# Patient Record
Sex: FEMALE | Race: WHITE | NOT HISPANIC OR LATINO | Employment: OTHER | URBAN - METROPOLITAN AREA
[De-identification: names, ages, dates, MRNs, and addresses within clinical notes are randomized per-mention and may not be internally consistent; named-entity substitution may affect disease eponyms.]

---

## 2021-05-26 ENCOUNTER — RECORDS - HEALTHEAST (OUTPATIENT)
Dept: ADMINISTRATIVE | Facility: CLINIC | Age: 51
End: 2021-05-26

## 2021-05-30 ENCOUNTER — RECORDS - HEALTHEAST (OUTPATIENT)
Dept: ADMINISTRATIVE | Facility: CLINIC | Age: 51
End: 2021-05-30

## 2021-06-02 ENCOUNTER — RECORDS - HEALTHEAST (OUTPATIENT)
Dept: ADMINISTRATIVE | Facility: CLINIC | Age: 51
End: 2021-06-02

## 2023-03-24 ENCOUNTER — ANCILLARY PROCEDURE (OUTPATIENT)
Dept: MAMMOGRAPHY | Facility: CLINIC | Age: 53
End: 2023-03-24
Attending: FAMILY MEDICINE

## 2023-03-24 DIAGNOSIS — N63.20 LEFT BREAST LUMP: ICD-10-CM

## 2023-03-24 PROCEDURE — 77062 BREAST TOMOSYNTHESIS BI: CPT

## 2023-03-24 PROCEDURE — 76642 ULTRASOUND BREAST LIMITED: CPT | Mod: 50

## 2023-03-29 ENCOUNTER — ANCILLARY PROCEDURE (OUTPATIENT)
Dept: MAMMOGRAPHY | Facility: CLINIC | Age: 53
End: 2023-03-29
Attending: FAMILY MEDICINE

## 2023-03-29 DIAGNOSIS — R92.0 MICROCALCIFICATIONS OF THE BREAST: ICD-10-CM

## 2023-03-29 DIAGNOSIS — N63.20 LEFT BREAST LUMP: ICD-10-CM

## 2023-03-29 PROCEDURE — 88305 TISSUE EXAM BY PATHOLOGIST: CPT | Mod: TC | Performed by: FAMILY MEDICINE

## 2023-03-29 PROCEDURE — 250N000009 HC RX 250: Performed by: RADIOLOGY

## 2023-03-29 PROCEDURE — A4648 IMPLANTABLE TISSUE MARKER: HCPCS

## 2023-03-29 PROCEDURE — 272N000715 MA STEREOTACTIC BREAST BIOPSY VACUUM LT

## 2023-03-29 PROCEDURE — 88305 TISSUE EXAM BY PATHOLOGIST: CPT | Mod: 26 | Performed by: PATHOLOGY

## 2023-03-29 RX ORDER — VITAMIN B COMPLEX
TABLET ORAL DAILY
COMMUNITY

## 2023-03-29 RX ORDER — LIDOCAINE HYDROCHLORIDE AND EPINEPHRINE 10; 10 MG/ML; UG/ML
10 INJECTION, SOLUTION INFILTRATION; PERINEURAL ONCE
Status: COMPLETED | OUTPATIENT
Start: 2023-03-29 | End: 2023-03-29

## 2023-03-29 RX ADMIN — LIDOCAINE HYDROCHLORIDE 10 ML: 10 INJECTION, SOLUTION INFILTRATION; PERINEURAL at 14:23

## 2023-03-29 RX ADMIN — LIDOCAINE HYDROCHLORIDE AND EPINEPHRINE 10 ML: 10; 10 INJECTION, SOLUTION INFILTRATION; PERINEURAL at 14:23

## 2023-03-29 NOTE — DISCHARGE INSTRUCTIONS

## 2023-03-30 ENCOUNTER — TELEPHONE (OUTPATIENT)
Dept: MAMMOGRAPHY | Facility: CLINIC | Age: 53
End: 2023-03-30

## 2023-03-30 LAB
PATH REPORT.COMMENTS IMP SPEC: NORMAL
PATH REPORT.FINAL DX SPEC: NORMAL
PATH REPORT.GROSS SPEC: NORMAL
PATH REPORT.MICROSCOPIC SPEC OTHER STN: NORMAL
PATH REPORT.RELEVANT HX SPEC: NORMAL
PHOTO IMAGE: NORMAL

## 2023-03-30 NOTE — TELEPHONE ENCOUNTER
At the request of the Breast Center Radiologist, Dr. Mon,  I phoned patient and discussed the benign breast biopsy results.      Per Dr. Mon's recommendation, patient was advised that she may return to her routine breast screening schedule.  Patient also has number to call for scheduling high risk breast screening consult.      Patient denies any concerns at her biopsy site. Questions were answered and my phone number given if she has further questions or concerns.  Ordering provider was informed of these results.  Patient verbalized understanding and agrees with the plan of care.       Leesa Skinner RN, BSN, PHN, CBCN  Breast Center Imaging Nurse Coordinator   [facility] [place]  [address]  [place] [address]  [phone]

## 2023-05-13 ENCOUNTER — HEALTH MAINTENANCE LETTER (OUTPATIENT)
Age: 53
End: 2023-05-13

## 2023-06-15 ENCOUNTER — MEDICAL CORRESPONDENCE (OUTPATIENT)
Dept: HEALTH INFORMATION MANAGEMENT | Facility: CLINIC | Age: 53
End: 2023-06-15

## 2023-06-15 ENCOUNTER — TRANSCRIBE ORDERS (OUTPATIENT)
Dept: MATERNAL FETAL MEDICINE | Facility: HOSPITAL | Age: 53
End: 2023-06-15

## 2023-06-15 ENCOUNTER — TRANSFERRED RECORDS (OUTPATIENT)
Dept: HEALTH INFORMATION MANAGEMENT | Facility: CLINIC | Age: 53
End: 2023-06-15

## 2023-06-15 DIAGNOSIS — O26.90 PREGNANCY RELATED CONDITION, ANTEPARTUM: Primary | ICD-10-CM

## 2023-06-15 LAB
ALT SERPL-CCNC: 28 U/L (ref 6–29)
AST SERPL-CCNC: 20 U/L (ref 10–35)
CREATININE (EXTERNAL): 0.53 MG/DL (ref 0.5–1.03)
GFR ESTIMATED (EXTERNAL): 113 ML/MIN/1.73M2
GLUCOSE (EXTERNAL): 87 MG/DL (ref 65–99)
HEP C HIM: NORMAL
HEPATITIS B SURFACE ANTIGEN (EXTERNAL): NONREACTIVE
HEPATITIS C ANTIBODY (EXTERNAL): NONREACTIVE
HIV 1&2 EXT: NORMAL
HIV1+2 AB SERPL QL IA: NONREACTIVE
HPV ABSTRACT: NORMAL
PAP-ABSTRACT: NORMAL
POTASSIUM (EXTERNAL): 4.2 MMOL/L (ref 3.5–5.3)
RUBELLA ANTIBODY IGG (EXTERNAL): NORMAL
TREPONEMA PALLIDUM ANTIBODY (EXTERNAL): NONREACTIVE

## 2023-06-19 ENCOUNTER — TELEPHONE (OUTPATIENT)
Dept: MATERNAL FETAL MEDICINE | Facility: CLINIC | Age: 53
End: 2023-06-19

## 2023-06-19 DIAGNOSIS — O09.529 AMA (ADVANCED MATERNAL AGE) MULTIGRAVIDA 35+: Primary | ICD-10-CM

## 2023-06-19 NOTE — TELEPHONE ENCOUNTER
Pt called as it stated she wanted a THANH to [facility] for AMA. Pt informed that she does not qualify as a primary pt for M, but offered to give pt information for [facility]. Pt states she is able to find the number. Informed pt that scheduling will call her in the next day to schedule appt for GC/NT/Radiologic consult. Pt verbalized agreement.     Annamarie Cha RN

## 2023-06-26 ENCOUNTER — PRE VISIT (OUTPATIENT)
Dept: MATERNAL FETAL MEDICINE | Facility: HOSPITAL | Age: 53
End: 2023-06-26

## 2023-06-28 ENCOUNTER — MEDICAL CORRESPONDENCE (OUTPATIENT)
Dept: HEALTH INFORMATION MANAGEMENT | Facility: CLINIC | Age: 53
End: 2023-06-28

## 2023-06-28 ENCOUNTER — ANCILLARY PROCEDURE (OUTPATIENT)
Dept: ULTRASOUND IMAGING | Facility: HOSPITAL | Age: 53
End: 2023-06-28
Attending: OBSTETRICS & GYNECOLOGY

## 2023-06-28 ENCOUNTER — OFFICE VISIT (OUTPATIENT)
Dept: MATERNAL FETAL MEDICINE | Facility: HOSPITAL | Age: 53
End: 2023-06-28
Attending: OBSTETRICS & GYNECOLOGY

## 2023-06-28 ENCOUNTER — LAB (OUTPATIENT)
Dept: LAB | Facility: HOSPITAL | Age: 53
End: 2023-06-28
Attending: OBSTETRICS & GYNECOLOGY

## 2023-06-28 VITALS — HEART RATE: 77 BPM | DIASTOLIC BLOOD PRESSURE: 70 MMHG | SYSTOLIC BLOOD PRESSURE: 114 MMHG | OXYGEN SATURATION: 100 %

## 2023-06-28 DIAGNOSIS — O09.529 AMA (ADVANCED MATERNAL AGE) MULTIGRAVIDA 35+: ICD-10-CM

## 2023-06-28 DIAGNOSIS — O09.521 MULTIGRAVIDA OF ADVANCED MATERNAL AGE IN FIRST TRIMESTER: ICD-10-CM

## 2023-06-28 PROCEDURE — 76813 OB US NUCHAL MEAS 1 GEST: CPT | Mod: 26 | Performed by: OBSTETRICS & GYNECOLOGY

## 2023-06-28 PROCEDURE — 99204 OFFICE O/P NEW MOD 45 MIN: CPT | Mod: 25 | Performed by: OBSTETRICS & GYNECOLOGY

## 2023-06-28 PROCEDURE — 36415 COLL VENOUS BLD VENIPUNCTURE: CPT

## 2023-06-28 PROCEDURE — 96040 HC GENETIC COUNSELING, EACH 30 MINUTES: CPT | Performed by: GENETIC COUNSELOR, MS

## 2023-06-28 PROCEDURE — G0463 HOSPITAL OUTPT CLINIC VISIT: HCPCS | Mod: 25 | Performed by: OBSTETRICS & GYNECOLOGY

## 2023-06-28 PROCEDURE — 76813 OB US NUCHAL MEAS 1 GEST: CPT

## 2023-06-28 NOTE — PROGRESS NOTES
Maternal-Fetal Medicine Consultation    Chuyita Walters  : 1970  MRN: [id number]    REFERRAL:  Chuyita Walters is a 52 year old sent by Dr. Oliver for consultation due to pregnancy at age 35 years or older.    HPI:  Chuyita Walters is a 52 year old  at 11w6d by LMP consistent with 8w0d ultrasound presenting for consultation regarding pregnancy at age 35 years or older. Chuyita reports a history of irregular menses, which she attributed to being perimenopausal. She had a spontaneous conception in the current pregnancy. The couple are very excited for the pregnancy. Chuyita reports that she is overall very healthy. She is active around the home. She does not have any significant past medical history. She did have a recent mammogram and breast biopsy in March of this year, which returned with fibrocystic changes. Repeat mammogram was recommended in one year. Her blood pressure was mildly elevated at 147/72 with repeat blood pressure of 135/73 at her first OB visit. She does not have any history of hypertension and her blood pressures have since been normal. Her previous pregnancies have been uncomplicated and she delivered at term. She did have one  section in , followed by a successful  in her last pregnancy in 2013.     Chuyita is feeling well today. She does note feeling mildly short of breath with activity during the current pregnancy. She feels like this is what she experienced in pervious pregnancies, but that it has just started earlier in the current pregnancy. She is currently on vaginal progesterone supplementation for low progesterone levels.    Pregnancy complicated by:  1) Maternal age 53 at the time of delivery  2) History of prior  section    Prenatal Care:  Primary OB care this pregnancy has been with Dr. Oliver from [facility].    Obstetrics History:  OB History    Para Term  AB Living   8 6 6 0 1 6   SAB IAB Ectopic Multiple Live Births   1 0 0 0 6      # Outcome  Date GA Lbr Lamine/2nd Weight Sex Delivery Anes PTL Lv   8 Current            7 SAB            6 Term            5 Term      CS-LTranv      4 Term      Vag-Spont      3 Term      Vag-Spont      2 Term      Vag-Spont      1 Term      Vag-Spont        Past Medical History:  1) Breast lump, s/p breast biopsy 3/2023    Past Surgical History:  1) LTCS in   2) Bilateral cataract surgery    Current Medications:  1) PNV  2) Progesterone 400 mg by mouth at night  3) Aspirin    Allergies:  Patient has no known allergies.    ROS:  10-point ROS negative except as in HPI     PHYSICAL EXAM:  /70 (BP Location: Right arm, Patient Position: Chair, Cuff Size: Adult Regular)   Pulse 77   LMP 2023   SpO2 100%   Gen: NAD, well appearing  Chest: Non-labored breathing  Abdomen: gravid    Genetic Testing:   Cell-free DNA screening drawn today    Ultrasounds:   Please see the imaging tab for details of the ultrasound performed today.    ASSESSMENT:  52 year old y.o.  at 11w6d by LMP, consistent with 8w 0d ultrasound here for consultation regarding maternal age of 53 at the time of delivery.    The risk of fetal aneuploidy increases with age. We reviewed these risks at length and we discussed the options available for risk assessment for aneuploidy during pregnancy. The couple are interested in cell-free DNA screening. They shared the results would not change their management of the pregnancy, but that if a chromosomal difference is suspected it would help them to prepare.    Women of very advanced maternal age (VAMA) (>=45 years) are at higher risk for several complications, including but not limited to: spontaneous , gestational diabetes mellitus, stillbirth  delivery at less than 37 and 34 weeks of gestation, placenta previa and accreta, hypertensive complications, and preeclampsia Their newborns were more frequently small for gestational age, and are more likely to have high rates of respiratory  distress syndrome,  intensive care admission, growth restriction and / mortality. Pregnancies have >50%  delivery rate. There is also an increased risk of prolonged hospital stay, postpartum hemorrhage, postpartum fever, metabolic complications and ICU admission.    Recommendations:    The patient has opted for cell-free DNA screening, which was performed through our office today.    Early GCT recommended now, with repeat testing at 24-28 weeks gestation if passes initial GDM screening.    Obtain baseline EKG, with echocardiogram if any abnormalities noted.    Comprehensive ultrasound at 18-20 weeks (scheduled in our office).    A repeat assessment of fetal growth at 28 and 34 weeks gestation.     surveillance with weekly BPP (or NST with MVP) beginning at 36 weeks.    Delivery is recommended at 39 weeks gestation.    The route of delivery should be based on the usual obstetric indications. Recommend continuing counseling regarding repeat  section versus TOLAC as the pregnancy progresses.    Low-dose aspirin (usually 81 mg daily) to reduce the risk for hypertensive disorder of pregnancy.      Colonoscopy recommended for colon cancer screening postpartum (discussed that this is recommended beginning at age 45).    45 MINUTES SPENT BY ME on the date of service doing chart review, history, exam, documentation & further activities per the note.    Mendy Arias MD  Date of Service (when I saw the patient): 23

## 2023-06-28 NOTE — PROGRESS NOTES
"[facility]  Genetic Counseling Consult    Patient:  Chuyita Walters YOB: 1970   Date of Service:  23   MRN: [id number]    Chuyita was seen at the [facility] [place] for genetic consultation. The indication for genetic counseling is advanced maternal age. The patient was accompanied to this visit by her partner, Franklyn.       IMPRESSION/ PLAN     During today's MFM visit, Chuyita had a blood draw for NIPS through [facility]. The NIPS screens for trisomy 21, 18, and 13 and the patient opted to screen for sex chromosome aneuploidies, including reported fetal sex. Results are expected in 7-10 days. The patient will be called with results and if they do not answer they requested a detailed message with results on their voicemail, including the predicted fetal sex information.  Patient was informed that results, including fetal sex, will be available in [employer].    Chuyita had a nuchal translucency ultrasound today. Please see the ultrasound report for further details.    Further recommendations include a level II ultrasound with MFM and maternal serum AFP only screening for neural tube defects, if desired (quad screen should NOT be performed). The level II ultrasound has been scheduled for 2023.     PREGNANCY HISTORY   /Parity:       Chuyita's pregnancy history is significant for:     2000: term, , female    2002: term, , female    2005: term, , female    2007: term, , female    2011: term, , female    2013: term, , male    2018: 7 week SAB    CURRENT PREGNANCY   Current Age: 52 year old   Age at Delivery: 53 year old  KRYSTA: 2024, by Last Menstrual Period                                   Gestational Age: 11w6d  This pregnancy is a single gestation.   This pregnancy was conceived spontaneously.      FAMILY HISTORY   A three-generation pedigree was obtained today and is scanned under the \"Media\" " tab in Epic. The family history was reported by Chuyita and her partner.    The following significant findings were reported today:     Chuyita's sister had a pregnancy loss with a nonviable trisomy, possibly trisomy 18.    Chuyita's sister has a daughter with Down syndrome. We reviewed that most cases of Down syndrome are caused by nondisjunction of chromosome 21 and would not increase their risk to have a child with Down syndrome. However, approximately 5% of individuals with Down syndrome can have an unbalanced translocation form of Down syndrome which may carry implications for other family members.     Chuyita's other sister has developmental disabilities which were thought to be caused by birth trauma.    Otherwise, the reported family history is unremarkable for multiple miscarriages, stillbirths, birth defects, intellectual disabilities, and consanguinity.       CARRIER SCREENING   Expanded carrier screening is available to screen for autosomal recessive conditions and X-linked conditions in a large list of genes. Autosomal recessive conditions happen when a mutation has been inherited from the egg and sperm and include conditions like cystic fibrosis, thalassemia, hearing loss, spinal muscular atrophy, and more. X-linked conditions happen when a mutation has been inherited from the egg and include conditions like fragile X syndrome. Gilbert screening was also reviewed. About [place]  Screening    The patient has declined the carrier screening options today. They are aware the option will remain, and they can contact us if they would like to pursue screening.       RISK ASSESSMENT FOR CHROMOSOME CONDITIONS   We explained that the risk for fetal chromosome abnormalities increases with maternal age. We discussed specific features of common chromosome abnormalities, including Down syndrome, trisomy 13, trisomy 18, and sex chromosome trisomies.      At age 53 at delivery, the midtrimester risk to have a baby with Down  syndrome is 15%.     At age 53 at delivery, the midtrimester risk to have a baby with any chromosome abnormality is 25%.     Chuyita has not had genetic screening in this pregnancy but elected to have screening today.      GENETIC TESTING OPTIONS   Genetic testing during a pregnancy includes screening and diagnostic procedures.      Screening tests are non-invasive which means no risk to the pregnancy and includes ultrasounds and blood work. The benefits and limitations of screening were reviewed. Screening tests provide a risk assessment (chance) specific to the pregnancy for certain fetal chromosome abnormalities but cannot definitively diagnose or exclude a fetal chromosome abnormality. Follow-up genetic counseling and consideration of diagnostic testing is recommended with any abnormal screening result. Diagnostic testing during a pregnancy is more certain and can test for more conditions. However, the tests do have a risk of miscarriage that requires careful consideration. These tests can detect fetal chromosome abnormalities with greater than 99% certainty. Results can be compromised by maternal cell contamination or mosaicism and are limited by the resolution of current genetic testing technology.     There is no screening or diagnostic test that detects all forms of birth defects or intellectual disability.     We discussed the following screening options:   First trimester screening    Ultrasound between [id number] that includes nuchal translucency measurement and nasal bone assessments    Nuchal translucency refers to the space at the back of the neck where fluid builds up. All babies at this stage have fluid and there is only concern if there is too much fluid    Nasal bone refers to the small bone in the nose. There is concern for conditions like Down syndrome if the bone cannot be seen at all    Blood work from arm for levels of hCG and NEDA-A    Screens for trisomy 21 and trisomy 18    Cannot screen for  open neural tube defects, maternal serum AFP after 15 weeks is recommended    Non-invasive prenatal testing (NIPT)    Also called cell-free DNA screening because it detects chromosomes from the placenta in the pregnant person's blood    Can be done any time after 10 weeks gestation    Screens for trisomy 21, trisomy 18, trisomy 13, and sex chromosome aneuploidies    Cannot screen for open neural tube defects, maternal serum AFP after 15 weeks is recommended      We discussed the following ultrasound options:  Nuchal translucency (NT) ultrasound    Ultrasound between [id number] that includes nuchal translucency measurement and nasal bone assessments    Nuchal translucency refers to the space at the back of the neck where fluid builds up. All babies at this stage have fluid and there is only concern if there is too much fluid    Nasal bone refers to the small bone in the nose. There is concern for conditions like Down syndrome if the bone cannot be seen at all    This ultrasound can be done as part of first trimester screening, at the same time as another screen (NIPT), at the same time as a CVS, or if the patients does not want genetic screening.    Markers on ultrasound detects about 70% of pregnancies with aneuploidy    Abnormalities on NT ultrasound can also increase the risk for a birth defect, like a heart defect    Comprehensive level II ultrasound (Fetal Anatomy Ultrasound)    Ultrasound done between 18-20 weeks gestation    Screens for major birth defects and markers for aneuploidy (like trisomy 21 and trisomy 18)    Includes looking at the fetus/baby's growth, heart, organs (stomach, kidneys), placenta, and amniotic fluid      We discussed the following diagnostic options:   Chorionic villus sampling (CVS)    Invasive diagnostic procedure done between 10w0d and 13w6d    The procedure collects a small sample from the placenta for the purpose of chromosomal testing and/or other genetic testing    Diagnostic  result; more than 99% sensitivity for fetal chromosome abnormalities    Cannot screen for open neural tube defects, maternal serum AFP after 15 weeks is recommended    Amniocentesis    Invasive diagnostic procedure done after 15 weeks gestation    The procedure collects a small sample of amniotic fluid for the purpose of chromosomal testing and/or other genetic testing    Diagnostic result; more than 99% sensitivity for fetal chromosome abnormalities    Testing for AFP in the amniotic fluid can test for open neural tube defects        It was a pleasure to be involved with Chuyita appiah praneeth. Face-to-face time of the meeting was 45 minutes.    Makeda Sanford, BAY, MS, LGC  Certified and [place] Licensed Genetic Counselor  [facility]  Maternal Fetal Medicine  Office: [phone]  [facility]: [phone]   Fax: [phone]  [facility]

## 2023-07-10 ENCOUNTER — TELEPHONE (OUTPATIENT)
Dept: MATERNAL FETAL MEDICINE | Facility: HOSPITAL | Age: 53
End: 2023-07-10

## 2023-07-10 ENCOUNTER — TRANSFERRED RECORDS (OUTPATIENT)
Dept: HEALTH INFORMATION MANAGEMENT | Facility: CLINIC | Age: 53
End: 2023-07-10

## 2023-07-10 DIAGNOSIS — O09.521 MULTIGRAVIDA OF ADVANCED MATERNAL AGE IN FIRST TRIMESTER: Primary | ICD-10-CM

## 2023-07-10 LAB — SCANNED LAB RESULT: NORMAL

## 2023-07-10 NOTE — TELEPHONE ENCOUNTER
Called patient and discussed NIPT testing failed quality metrics and the lab was not able to issue a result. Will attempt a repeat sample. Orders placed    Makeda Sanford MS, [facility]  Maternal Fetal Medicine  [facility]  Phone:[phone]

## 2023-07-11 ENCOUNTER — MEDICAL CORRESPONDENCE (OUTPATIENT)
Dept: HEALTH INFORMATION MANAGEMENT | Facility: CLINIC | Age: 53
End: 2023-07-11

## 2023-07-11 ENCOUNTER — LAB (OUTPATIENT)
Dept: LAB | Facility: HOSPITAL | Age: 53
End: 2023-07-11

## 2023-07-11 DIAGNOSIS — O09.521 MULTIGRAVIDA OF ADVANCED MATERNAL AGE IN FIRST TRIMESTER: ICD-10-CM

## 2023-07-11 PROCEDURE — 36415 COLL VENOUS BLD VENIPUNCTURE: CPT

## 2023-07-12 ENCOUNTER — TRANSCRIBE ORDERS (OUTPATIENT)
Dept: OTHER | Age: 53
End: 2023-07-12

## 2023-07-12 DIAGNOSIS — O09.529 ANTEPARTUM MULTIGRAVIDA OF ADVANCED MATERNAL AGE: Primary | ICD-10-CM

## 2023-07-14 ENCOUNTER — MEDICAL CORRESPONDENCE (OUTPATIENT)
Dept: HEALTH INFORMATION MANAGEMENT | Facility: CLINIC | Age: 53
End: 2023-07-14

## 2023-07-17 ENCOUNTER — TELEPHONE (OUTPATIENT)
Dept: MATERNAL FETAL MEDICINE | Facility: HOSPITAL | Age: 53
End: 2023-07-17

## 2023-07-17 LAB — SCANNED LAB RESULT: NORMAL

## 2023-07-20 ENCOUNTER — OFFICE VISIT (OUTPATIENT)
Dept: CARDIOLOGY | Facility: CLINIC | Age: 53
End: 2023-07-20

## 2023-07-20 VITALS
OXYGEN SATURATION: 98 % | DIASTOLIC BLOOD PRESSURE: 65 MMHG | WEIGHT: 184 LBS | HEART RATE: 86 BPM | SYSTOLIC BLOOD PRESSURE: 112 MMHG

## 2023-07-20 DIAGNOSIS — O09.529 ANTEPARTUM MULTIGRAVIDA OF ADVANCED MATERNAL AGE: ICD-10-CM

## 2023-07-20 DIAGNOSIS — O09.522 MULTIGRAVIDA OF ADVANCED MATERNAL AGE IN SECOND TRIMESTER: ICD-10-CM

## 2023-07-20 PROCEDURE — 93000 ELECTROCARDIOGRAM COMPLETE: CPT | Performed by: INTERNAL MEDICINE

## 2023-07-20 PROCEDURE — 99203 OFFICE O/P NEW LOW 30 MIN: CPT | Performed by: INTERNAL MEDICINE

## 2023-07-20 RX ORDER — ASPIRIN 81 MG/1
81 TABLET ORAL DAILY
Status: ON HOLD | COMMUNITY
End: 2023-12-20

## 2023-07-20 RX ORDER — PROGESTERONE 200 MG/1
400 CAPSULE ORAL AT BEDTIME
Status: ON HOLD | COMMUNITY
Start: 2023-06-28 | End: 2023-12-17

## 2023-07-20 NOTE — PROGRESS NOTES
CARDIOLOGY CLINIC CONSULTATION    PRIMARY CARE PHYSICIAN:  KYLE DUNN    Tests reviewed/interpreted independently in clinic today:   1. EKG: NSR  2. Echocardiogram: NA  3. Blood work: CBC, BMP     The level of medical decision making during this visit was of mild complexity.     HISTORY OF PRESENT ILLNESS:  Today, I had the pleasure of connecting with Chuyita Walters.  She is a very pleasant 52-year-old lady who presents to the clinic in initial consultation for screening due to advanced maternal age.  She is due in January of next year.  She does not have any history of postpartum cardiomyopathy and is otherwise very healthy.  She does not have any cardiovascular risk factors.  She has noticed mild ankle edema but no other worrisome symptoms suggestive of heart failure.  She also has not had any conduction issues and denies palpitations, presyncope or syncope.  An EKG performed in the clinic today shows normal sinus rhythm.    ASSESSMENT: Pertinent issues addressed/ reviewed during this cardiology visit    1. Screening for advanced maternal age  2. No history of previous postpartum cardiomyopathy    RECOMMENDATIONS:  1. Her  EKG is normal and I discussed this with her.  We went over basic precautions that can be taken during pregnancy to avoid hypotension and minimize lower extremity edema.  2. I will perform a transthoracic echocardiogram to obtain a baseline and try to get this done in the third trimester when the circulating volume is the maximum.  As far as the decision to perform  which is normal delivery is concerned this will basically be the choice of her obstetrician, if echocardiogram is completely normal.    Orders Placed This Encounter   Procedures     EKG 12-lead complete w/read - Clinics     Echocardiogram Complete       PAST MEDICAL HISTORY:  No past medical history on file.    MEDICATIONS:  Current Outpatient Medications   Medication     aspirin 81 MG EC tablet     progesterone  (PROMETRIUM) 200 MG capsule     Vitamin D3 (VITAMIN D, CHOLECALCIFEROL,) 25 mcg (1000 units) tablet     No current facility-administered medications for this visit.       ALLERGIES:  No Known Allergies    SOCIAL HISTORY:  I have reviewed this patient's social history and updated it with pertinent information if needed. Chuyita Walters  reports that she has never smoked. She has never used smokeless tobacco.    FAMILY HISTORY:  I have reviewed this patient's family history and updated it with pertinent information if needed.   No family history on file.    REVIEW OF SYSTEMS:  Skin:        Eyes:       ENT:       Respiratory:  Positive for shortness of breath;dyspnea on exertion  Cardiovascular:  Negative;chest pain;palpitations;edema;dizziness;lightheadedness;syncope or near-syncope Positive for;fatigue  Gastroenterology:      Genitourinary:       Musculoskeletal:       Neurologic:       Psychiatric:       Heme/Lymph/Imm:       Endocrine:           PHYSICAL EXAM:      BP: 112/65 Pulse: 86     SpO2: 98 %      Vital Signs with Ranges  Pulse:  [86] 86  BP: (112)/(65) 112/65  SpO2:  [98 %] 98 %  184 lbs 0 oz    Constitutional: alert, no distress  Respiratory: Good bilateral air entry  Cardiovascular: Normal S1-S2, no murmurs  GI: nondistended  Neuropsychiatric: appropriate affact    It was a pleasure seeing this patient in clinic today. Please do not hesitate to contact me with any future questions.     Carrillo MACK, FACC, [facility]  Cardiology - [facility]  July 20, 2023    This note was completed in part using dictation via the Dragon voice recognition software. Some word and grammatical errors may occur and must be interpreted in the appropriate clinical context.  If there are any questions pertaining to this issue, please contact me for further clarification.

## 2023-07-20 NOTE — LETTER
2023    KYLE DUNN MD  [facility] [address]  [facility] [address]    RE: Chuyita Walters       Dear Colleague,     I had the pleasure of seeing Chuyita Walters in the [facility].  CARDIOLOGY CLINIC CONSULTATION    PRIMARY CARE PHYSICIAN:  KYLE DUNN    Tests reviewed/interpreted independently in clinic today:   EKG: NSR  Echocardiogram: NA  Blood work: CBC, BMP     The level of medical decision making during this visit was of mild complexity.     HISTORY OF PRESENT ILLNESS:  Today, I had the pleasure of connecting with Chuyita Walters.  She is a very pleasant 52-year-old lady who presents to the clinic in initial consultation for screening due to advanced maternal age.  She is due in January of next year.  She does not have any history of postpartum cardiomyopathy and is otherwise very healthy.  She does not have any cardiovascular risk factors.  She has noticed mild ankle edema but no other worrisome symptoms suggestive of heart failure.  She also has not had any conduction issues and denies palpitations, presyncope or syncope.  An EKG performed in the clinic today shows normal sinus rhythm.    ASSESSMENT: Pertinent issues addressed/ reviewed during this cardiology visit    Screening for advanced maternal age  No history of previous postpartum cardiomyopathy    RECOMMENDATIONS:  Her  EKG is normal and I discussed this with her.  We went over basic precautions that can be taken during pregnancy to avoid hypotension and minimize lower extremity edema.  I will perform a transthoracic echocardiogram to obtain a baseline and try to get this done in the third trimester when the circulating volume is the maximum.  As far as the decision to perform  which is normal delivery is concerned this will basically be the choice of her obstetrician, if echocardiogram is completely normal.    Orders Placed This Encounter   Procedures    EKG 12-lead complete w/read - Clinics     Echocardiogram Complete       PAST MEDICAL HISTORY:  No past medical history on file.    MEDICATIONS:  Current Outpatient Medications   Medication    aspirin 81 MG EC tablet    progesterone (PROMETRIUM) 200 MG capsule    Vitamin D3 (VITAMIN D, CHOLECALCIFEROL,) 25 mcg (1000 units) tablet     No current facility-administered medications for this visit.       ALLERGIES:  No Known Allergies    SOCIAL HISTORY:  I have reviewed this patient's social history and updated it with pertinent information if needed. Chuyita Walters  reports that she has never smoked. She has never used smokeless tobacco.    FAMILY HISTORY:  I have reviewed this patient's family history and updated it with pertinent information if needed.   No family history on file.    REVIEW OF SYSTEMS:  Skin:        Eyes:       ENT:       Respiratory:  Positive for shortness of breath;dyspnea on exertion  Cardiovascular:  Negative;chest pain;palpitations;edema;dizziness;lightheadedness;syncope or near-syncope Positive for;fatigue  Gastroenterology:      Genitourinary:       Musculoskeletal:       Neurologic:       Psychiatric:       Heme/Lymph/Imm:       Endocrine:           PHYSICAL EXAM:      BP: 112/65 Pulse: 86     SpO2: 98 %      Vital Signs with Ranges  Pulse:  [86] 86  BP: (112)/(65) 112/65  SpO2:  [98 %] 98 %  184 lbs 0 oz    Constitutional: alert, no distress  Respiratory: Good bilateral air entry  Cardiovascular: Normal S1-S2, no murmurs  GI: nondistended  Neuropsychiatric: appropriate affact    It was a pleasure seeing this patient in clinic today. Please do not hesitate to contact me with any future questions.     Carrillo MACK, FACC, [facility]  Cardiology - [facility]  July 20, 2023    This note was completed in part using dictation via the Dragon voice recognition software. Some word and grammatical errors may occur and must be interpreted in the appropriate clinical context.  If there are any questions pertaining to this issue, please contact me  for further clarification.      Thank you for allowing me to participate in the care of your patient.      Sincerely,     Carrillo Shaw MD     [facility]  cc:   No referring provider defined for this encounter.

## 2023-08-16 ENCOUNTER — OFFICE VISIT (OUTPATIENT)
Dept: MATERNAL FETAL MEDICINE | Facility: HOSPITAL | Age: 53
End: 2023-08-16
Attending: OBSTETRICS & GYNECOLOGY

## 2023-08-16 ENCOUNTER — ANCILLARY PROCEDURE (OUTPATIENT)
Dept: ULTRASOUND IMAGING | Facility: HOSPITAL | Age: 53
End: 2023-08-16
Attending: OBSTETRICS & GYNECOLOGY

## 2023-08-16 DIAGNOSIS — O09.529 ANTEPARTUM MULTIGRAVIDA OF ADVANCED MATERNAL AGE: Primary | ICD-10-CM

## 2023-08-16 DIAGNOSIS — O09.521 MULTIGRAVIDA OF ADVANCED MATERNAL AGE IN FIRST TRIMESTER: ICD-10-CM

## 2023-08-16 PROCEDURE — 99203 OFFICE O/P NEW LOW 30 MIN: CPT | Mod: 25 | Performed by: OBSTETRICS & GYNECOLOGY

## 2023-08-16 PROCEDURE — 76811 OB US DETAILED SNGL FETUS: CPT | Mod: 26 | Performed by: OBSTETRICS & GYNECOLOGY

## 2023-08-16 PROCEDURE — 76811 OB US DETAILED SNGL FETUS: CPT

## 2023-08-16 NOTE — NURSING NOTE
Chuyita Walters is a  at 18w6d who presents to [facility] for a comprehensive ultrasound. SBAR given to Dr. Jiang, see note in Epic.      Renetta Hanna RN

## 2023-08-16 NOTE — PROGRESS NOTES
Please see full imaging report from [employer] program under imaging tab.    Thank-you for referring your patient for a comprehensive ultrasound.    I discussed the findings on today's ultrasound with the patient. I reviewed the limitations of ultrasound both in detecting aneuploidy and structural abnormalities.  Ultrasound can routinely detect 80-90% of structural abnormalities. She had low risk cell free fetal DNA for genetic screening this pregnancy. She will be 53 years of age at [facility]. This is a spontaneous pregnancy.     I discussed that on today s ultrasound choroid plexus cysts (CPC) were noted. We discussed that CPCs are seen in 1-2% of all pregnancies in the second trimester and can be single or multiple, unilateral or bilateral and are often small (<1cm) in diameter. In the absence of other anatomic abnormalities or soft markers in conjunction with her low risk cell free fetal DNA result this is considered to be a normal finding.  Finally, we discussed that CPCs are not considered a structural or functional brain abnormality and therefore have no impact on structural brain development or function. Specifically, there is no association with neurodevelopmental outcomes. Almost all resolve by 28 weeks and they do not require any follow-up prenatal or postnatally.     We discussed recommended surveillance in the context of maternal age.  I have recommended growth US at 28 and 34 weeks, and she would like to have those done with [facility]. She will continue OB care at [facility]. She will discuss with them where she will do her BPPs, recommended at 36 weeks. Delivery by [facility] is also strongly recommended.     Chuyita and her partner also inquired about our input regarding her recent borderline early one hour GCT of just over 135. They are aware of the different one hour GCT cut offs at different institutions. Chuyita also did not tolerate well her 3 hour GTT in prior pregnancies and is apprehensive about doing that now and again at  25-28 weeks.     We discussed that the standard of care would be to proceed with an early GTT, and if that is normal to repeat it at 25-28 weeks. She is at an increased risk of GDM based on age. However, given her prior response to the GTT an alternative might be to have her primary OB team get her a glucometer and instruct in its use, and she could check glucose values 3-4 times daily for a week. If the values are all normal, she can wait until 25-28 weeks and then either do the GTT or repeat the week of glucose values. If values are noted to be abnormal, a tentative diagnosis of GDM could be made and she can institute regular glucose surveillance. She is also going to focus on dietary and exercise changes in the meantime. She will contact her OB team on Friday to review this discussion in more detail and we will of course defer to their care for next steps.     She also started on low dose aspirin recently which we support.     Return to primary provider for continued prenatal care.    If you have questions regarding today's evaluation or if we can be of further service, please contact the [facility].    **Fetal anomalies may be present but not detected**    I spent a total of 35 minutes on the date of this encounter including preparing to see the patient (reviewing medical records/tests), counseling and discussing the plan of care, documenting the visit in the electronic medical record, and communicating with other health care professionals and/or care coordination.    Perri Jiang MD  Maternal Fetal Medicine

## 2023-10-20 ENCOUNTER — HOSPITAL ENCOUNTER (OUTPATIENT)
Dept: CARDIOLOGY | Facility: CLINIC | Age: 53
Discharge: HOME | End: 2023-10-20
Attending: INTERNAL MEDICINE | Admitting: INTERNAL MEDICINE

## 2023-10-20 DIAGNOSIS — O09.522 MULTIGRAVIDA OF ADVANCED MATERNAL AGE IN SECOND TRIMESTER: ICD-10-CM

## 2023-10-20 LAB — LVEF ECHO: NORMAL

## 2023-10-20 PROCEDURE — 93306 TTE W/DOPPLER COMPLETE: CPT | Mod: 26 | Performed by: INTERNAL MEDICINE

## 2023-10-20 PROCEDURE — 93306 TTE W/DOPPLER COMPLETE: CPT

## 2023-12-17 ENCOUNTER — TRANSFERRED RECORDS (OUTPATIENT)
Dept: HEALTH INFORMATION MANAGEMENT | Facility: CLINIC | Age: 53
End: 2023-12-17

## 2023-12-17 ENCOUNTER — ANESTHESIA (OUTPATIENT)
Dept: OBGYN | Facility: HOSPITAL | Age: 53
End: 2023-12-17

## 2023-12-17 ENCOUNTER — HOSPITAL ENCOUNTER (INPATIENT)
Facility: HOSPITAL | Age: 53
LOS: 3 days | Discharge: HOME | End: 2023-12-20
Attending: OBSTETRICS & GYNECOLOGY | Admitting: OBSTETRICS & GYNECOLOGY

## 2023-12-17 ENCOUNTER — ANESTHESIA EVENT (OUTPATIENT)
Dept: OBGYN | Facility: HOSPITAL | Age: 53
End: 2023-12-17

## 2023-12-17 DIAGNOSIS — Z98.891 S/P REPEAT LOW TRANSVERSE C-SECTION: Primary | ICD-10-CM

## 2023-12-17 DIAGNOSIS — O42.90 LEAKAGE OF AMNIOTIC FLUID: ICD-10-CM

## 2023-12-17 PROBLEM — Z36.89 ENCOUNTER FOR TRIAGE IN PREGNANT PATIENT: Status: ACTIVE | Noted: 2023-12-17

## 2023-12-17 LAB
ABO/RH(D): NORMAL
ALBUMIN SERPL BCG-MCNC: 3.4 G/DL (ref 3.5–5.2)
ALP SERPL-CCNC: 108 U/L (ref 40–150)
ALT SERPL W P-5'-P-CCNC: 17 U/L (ref 0–50)
ANION GAP SERPL CALCULATED.3IONS-SCNC: 10 MMOL/L (ref 7–15)
ANTIBODY SCREEN, TUBE: NORMAL
AST SERPL W P-5'-P-CCNC: 28 U/L (ref 0–45)
BILIRUB SERPL-MCNC: 0.3 MG/DL
BUN SERPL-MCNC: 12.7 MG/DL (ref 6–20)
CALCIUM SERPL-MCNC: 9 MG/DL (ref 8.6–10)
CHLORIDE SERPL-SCNC: 105 MMOL/L (ref 98–107)
CREAT SERPL-MCNC: 0.54 MG/DL (ref 0.51–0.95)
DEPRECATED HCO3 PLAS-SCNC: 22 MMOL/L (ref 22–29)
EGFRCR SERPLBLD CKD-EPI 2021: >90 ML/MIN/1.73M2
ERYTHROCYTE [DISTWIDTH] IN BLOOD BY AUTOMATED COUNT: 14.9 % (ref 10–15)
GLUCOSE BLDC GLUCOMTR-MCNC: 81 MG/DL (ref 70–99)
GLUCOSE SERPL-MCNC: 81 MG/DL (ref 70–99)
HCT VFR BLD AUTO: 40.9 % (ref 35–47)
HGB BLD-MCNC: 13.5 G/DL (ref 11.7–15.7)
HOLD SPECIMEN: NORMAL
MCH RBC QN AUTO: 31 PG (ref 26.5–33)
MCHC RBC AUTO-ENTMCNC: 33 G/DL (ref 31.5–36.5)
MCV RBC AUTO: 94 FL (ref 78–100)
PLATELET # BLD AUTO: 280 10E3/UL (ref 150–450)
POTASSIUM SERPL-SCNC: 4.2 MMOL/L (ref 3.4–5.3)
PROT SERPL-MCNC: 6.6 G/DL (ref 6.4–8.3)
RBC # BLD AUTO: 4.35 10E6/UL (ref 3.8–5.2)
RUPTURE OF FETAL MEMBRANES BY ROM PLUS: POSITIVE
SODIUM SERPL-SCNC: 137 MMOL/L (ref 135–145)
SPECIMEN EXPIRATION DATE: NORMAL
SPECIMEN EXPIRATION DATE: NORMAL
WBC # BLD AUTO: 11.4 10E3/UL (ref 4–11)

## 2023-12-17 PROCEDURE — 86780 TREPONEMA PALLIDUM: CPT | Performed by: OBSTETRICS & GYNECOLOGY

## 2023-12-17 PROCEDURE — C9290 INJ, BUPIVACAINE LIPOSOME: HCPCS | Performed by: ANESTHESIOLOGY

## 2023-12-17 PROCEDURE — 258N000003 HC RX IP 258 OP 636: Performed by: NURSE ANESTHETIST, CERTIFIED REGISTERED

## 2023-12-17 PROCEDURE — 86901 BLOOD TYPING SEROLOGIC RH(D): CPT | Performed by: OBSTETRICS & GYNECOLOGY

## 2023-12-17 PROCEDURE — 999N000249 HC STATISTIC C-SECTION ON UNIT

## 2023-12-17 PROCEDURE — 250N000013 HC RX MED GY IP 250 OP 250 PS 637: Performed by: OBSTETRICS & GYNECOLOGY

## 2023-12-17 PROCEDURE — 360N000076 HC SURGERY LEVEL 3, PER MIN: Performed by: OBSTETRICS & GYNECOLOGY

## 2023-12-17 PROCEDURE — 86850 RBC ANTIBODY SCREEN: CPT | Performed by: OBSTETRICS & GYNECOLOGY

## 2023-12-17 PROCEDURE — 250N000011 HC RX IP 250 OP 636: Performed by: OBSTETRICS & GYNECOLOGY

## 2023-12-17 PROCEDURE — 84112 EVAL AMNIOTIC FLUID PROTEIN: CPT | Performed by: OBSTETRICS & GYNECOLOGY

## 2023-12-17 PROCEDURE — 258N000003 HC RX IP 258 OP 636: Performed by: OBSTETRICS & GYNECOLOGY

## 2023-12-17 PROCEDURE — 250N000011 HC RX IP 250 OP 636: Performed by: NURSE ANESTHETIST, CERTIFIED REGISTERED

## 2023-12-17 PROCEDURE — 250N000011 HC RX IP 250 OP 636: Performed by: ANESTHESIOLOGY

## 2023-12-17 PROCEDURE — 80053 COMPREHEN METABOLIC PANEL: CPT | Performed by: OBSTETRICS & GYNECOLOGY

## 2023-12-17 PROCEDURE — 370N000017 HC ANESTHESIA TECHNICAL FEE, PER MIN: Performed by: OBSTETRICS & GYNECOLOGY

## 2023-12-17 PROCEDURE — 120N000001 HC R&B MED SURG/OB

## 2023-12-17 PROCEDURE — 250N000011 HC RX IP 250 OP 636: Mod: JZ | Performed by: NURSE ANESTHETIST, CERTIFIED REGISTERED

## 2023-12-17 PROCEDURE — 250N000009 HC RX 250: Performed by: OBSTETRICS & GYNECOLOGY

## 2023-12-17 PROCEDURE — 272N000001 HC OR GENERAL SUPPLY STERILE: Performed by: OBSTETRICS & GYNECOLOGY

## 2023-12-17 PROCEDURE — 85027 COMPLETE CBC AUTOMATED: CPT | Performed by: OBSTETRICS & GYNECOLOGY

## 2023-12-17 PROCEDURE — 250N000009 HC RX 250: Performed by: NURSE ANESTHETIST, CERTIFIED REGISTERED

## 2023-12-17 RX ORDER — TRANEXAMIC ACID 10 MG/ML
1 INJECTION, SOLUTION INTRAVENOUS EVERY 30 MIN PRN
Status: DISCONTINUED | OUTPATIENT
Start: 2023-12-17 | End: 2023-12-20 | Stop reason: HOSPADM

## 2023-12-17 RX ORDER — DEXTROSE, SODIUM CHLORIDE, SODIUM LACTATE, POTASSIUM CHLORIDE, AND CALCIUM CHLORIDE 5; .6; .31; .03; .02 G/100ML; G/100ML; G/100ML; G/100ML; G/100ML
INJECTION, SOLUTION INTRAVENOUS CONTINUOUS
Status: DISCONTINUED | OUTPATIENT
Start: 2023-12-17 | End: 2023-12-20 | Stop reason: HOSPADM

## 2023-12-17 RX ORDER — ONDANSETRON 4 MG/1
4 TABLET, ORALLY DISINTEGRATING ORAL EVERY 30 MIN PRN
Status: DISCONTINUED | OUTPATIENT
Start: 2023-12-17 | End: 2023-12-20 | Stop reason: HOSPADM

## 2023-12-17 RX ORDER — SIMETHICONE 80 MG
80 TABLET,CHEWABLE ORAL 4 TIMES DAILY PRN
Status: DISCONTINUED | OUTPATIENT
Start: 2023-12-17 | End: 2023-12-20 | Stop reason: HOSPADM

## 2023-12-17 RX ORDER — ACETAMINOPHEN 325 MG/1
975 TABLET ORAL ONCE
Status: COMPLETED | OUTPATIENT
Start: 2023-12-17 | End: 2023-12-17

## 2023-12-17 RX ORDER — TRANEXAMIC ACID 10 MG/ML
1 INJECTION, SOLUTION INTRAVENOUS EVERY 30 MIN PRN
Status: DISCONTINUED | OUTPATIENT
Start: 2023-12-17 | End: 2023-12-17 | Stop reason: HOSPADM

## 2023-12-17 RX ORDER — CARBOPROST TROMETHAMINE 250 UG/ML
250 INJECTION, SOLUTION INTRAMUSCULAR
Status: DISCONTINUED | OUTPATIENT
Start: 2023-12-17 | End: 2023-12-20 | Stop reason: HOSPADM

## 2023-12-17 RX ORDER — BUPIVACAINE HYDROCHLORIDE 2.5 MG/ML
INJECTION, SOLUTION EPIDURAL; INFILTRATION; INTRACAUDAL
Status: COMPLETED | OUTPATIENT
Start: 2023-12-17 | End: 2023-12-17

## 2023-12-17 RX ORDER — PENICILLIN G 3000000 [IU]/50ML
3 INJECTION, SOLUTION INTRAVENOUS EVERY 4 HOURS
Status: DISCONTINUED | OUTPATIENT
Start: 2023-12-17 | End: 2023-12-17

## 2023-12-17 RX ORDER — KETOROLAC TROMETHAMINE 30 MG/ML
30 INJECTION, SOLUTION INTRAMUSCULAR; INTRAVENOUS EVERY 6 HOURS
Qty: 3 ML | Refills: 0 | Status: COMPLETED | OUTPATIENT
Start: 2023-12-17 | End: 2023-12-18

## 2023-12-17 RX ORDER — LIDOCAINE 40 MG/G
CREAM TOPICAL
Status: DISCONTINUED | OUTPATIENT
Start: 2023-12-17 | End: 2023-12-20 | Stop reason: HOSPADM

## 2023-12-17 RX ORDER — SODIUM CHLORIDE, SODIUM LACTATE, POTASSIUM CHLORIDE, CALCIUM CHLORIDE 600; 310; 30; 20 MG/100ML; MG/100ML; MG/100ML; MG/100ML
INJECTION, SOLUTION INTRAVENOUS CONTINUOUS
Status: DISCONTINUED | OUTPATIENT
Start: 2023-12-17 | End: 2023-12-17 | Stop reason: HOSPADM

## 2023-12-17 RX ORDER — OXYTOCIN/0.9 % SODIUM CHLORIDE 30/500 ML
PLASTIC BAG, INJECTION (ML) INTRAVENOUS CONTINUOUS PRN
Status: DISCONTINUED | OUTPATIENT
Start: 2023-12-17 | End: 2023-12-17

## 2023-12-17 RX ORDER — CEFAZOLIN SODIUM/WATER 2 G/20 ML
2 SYRINGE (ML) INTRAVENOUS
Status: COMPLETED | OUTPATIENT
Start: 2023-12-17 | End: 2023-12-17

## 2023-12-17 RX ORDER — METOCLOPRAMIDE HYDROCHLORIDE 5 MG/ML
10 INJECTION INTRAMUSCULAR; INTRAVENOUS EVERY 6 HOURS PRN
Status: DISCONTINUED | OUTPATIENT
Start: 2023-12-17 | End: 2023-12-20 | Stop reason: HOSPADM

## 2023-12-17 RX ORDER — METHYLERGONOVINE MALEATE 0.2 MG/ML
200 INJECTION INTRAVENOUS
Status: DISCONTINUED | OUTPATIENT
Start: 2023-12-17 | End: 2023-12-17 | Stop reason: HOSPADM

## 2023-12-17 RX ORDER — ONDANSETRON 4 MG/1
4 TABLET, ORALLY DISINTEGRATING ORAL EVERY 6 HOURS PRN
Status: DISCONTINUED | OUTPATIENT
Start: 2023-12-17 | End: 2023-12-20 | Stop reason: HOSPADM

## 2023-12-17 RX ORDER — PENICILLIN G POTASSIUM 5000000 [IU]/1
5 INJECTION, POWDER, FOR SOLUTION INTRAMUSCULAR; INTRAVENOUS ONCE
Status: COMPLETED | OUTPATIENT
Start: 2023-12-17 | End: 2023-12-17

## 2023-12-17 RX ORDER — LIDOCAINE 40 MG/G
CREAM TOPICAL
Status: DISCONTINUED | OUTPATIENT
Start: 2023-12-17 | End: 2023-12-17 | Stop reason: HOSPADM

## 2023-12-17 RX ORDER — CARBOPROST TROMETHAMINE 250 UG/ML
250 INJECTION, SOLUTION INTRAMUSCULAR
Status: DISCONTINUED | OUTPATIENT
Start: 2023-12-17 | End: 2023-12-17 | Stop reason: HOSPADM

## 2023-12-17 RX ORDER — OXYTOCIN/0.9 % SODIUM CHLORIDE 30/500 ML
100-340 PLASTIC BAG, INJECTION (ML) INTRAVENOUS CONTINUOUS PRN
Status: DISCONTINUED | OUTPATIENT
Start: 2023-12-17 | End: 2023-12-20 | Stop reason: HOSPADM

## 2023-12-17 RX ORDER — METHYLERGONOVINE MALEATE 0.2 MG/ML
200 INJECTION INTRAVENOUS
Status: DISCONTINUED | OUTPATIENT
Start: 2023-12-17 | End: 2023-12-20 | Stop reason: HOSPADM

## 2023-12-17 RX ORDER — BISACODYL 10 MG
10 SUPPOSITORY, RECTAL RECTAL DAILY PRN
Status: DISCONTINUED | OUTPATIENT
Start: 2023-12-19 | End: 2023-12-19

## 2023-12-17 RX ORDER — MISOPROSTOL 200 UG/1
400 TABLET ORAL
Status: DISCONTINUED | OUTPATIENT
Start: 2023-12-17 | End: 2023-12-20 | Stop reason: HOSPADM

## 2023-12-17 RX ORDER — METOCLOPRAMIDE 10 MG/1
10 TABLET ORAL EVERY 6 HOURS PRN
Status: DISCONTINUED | OUTPATIENT
Start: 2023-12-17 | End: 2023-12-20 | Stop reason: HOSPADM

## 2023-12-17 RX ORDER — ONDANSETRON 2 MG/ML
4 INJECTION INTRAMUSCULAR; INTRAVENOUS EVERY 30 MIN PRN
Status: DISCONTINUED | OUTPATIENT
Start: 2023-12-17 | End: 2023-12-20 | Stop reason: HOSPADM

## 2023-12-17 RX ORDER — OXYTOCIN 10 [USP'U]/ML
10 INJECTION, SOLUTION INTRAMUSCULAR; INTRAVENOUS
Status: DISCONTINUED | OUTPATIENT
Start: 2023-12-17 | End: 2023-12-20 | Stop reason: HOSPADM

## 2023-12-17 RX ORDER — BUPIVACAINE HYDROCHLORIDE 7.5 MG/ML
INJECTION, SOLUTION INTRASPINAL
Status: COMPLETED | OUTPATIENT
Start: 2023-12-17 | End: 2023-12-17

## 2023-12-17 RX ORDER — OXYTOCIN/0.9 % SODIUM CHLORIDE 30/500 ML
340 PLASTIC BAG, INJECTION (ML) INTRAVENOUS CONTINUOUS PRN
Status: DISCONTINUED | OUTPATIENT
Start: 2023-12-17 | End: 2023-12-17 | Stop reason: HOSPADM

## 2023-12-17 RX ORDER — ONDANSETRON 2 MG/ML
4 INJECTION INTRAMUSCULAR; INTRAVENOUS EVERY 6 HOURS PRN
Status: DISCONTINUED | OUTPATIENT
Start: 2023-12-17 | End: 2023-12-20 | Stop reason: HOSPADM

## 2023-12-17 RX ORDER — MORPHINE SULFATE 1 MG/ML
INJECTION, SOLUTION EPIDURAL; INTRATHECAL; INTRAVENOUS
Status: COMPLETED | OUTPATIENT
Start: 2023-12-17 | End: 2023-12-17

## 2023-12-17 RX ORDER — IBUPROFEN 800 MG/1
800 TABLET, FILM COATED ORAL EVERY 6 HOURS
Status: DISCONTINUED | OUTPATIENT
Start: 2023-12-18 | End: 2023-12-20 | Stop reason: HOSPADM

## 2023-12-17 RX ORDER — PROCHLORPERAZINE 25 MG
25 SUPPOSITORY, RECTAL RECTAL EVERY 12 HOURS PRN
Status: DISCONTINUED | OUTPATIENT
Start: 2023-12-17 | End: 2023-12-20 | Stop reason: HOSPADM

## 2023-12-17 RX ORDER — MISOPROSTOL 200 UG/1
800 TABLET ORAL
Status: DISCONTINUED | OUTPATIENT
Start: 2023-12-17 | End: 2023-12-20 | Stop reason: HOSPADM

## 2023-12-17 RX ORDER — NALOXONE HYDROCHLORIDE 0.4 MG/ML
0.2 INJECTION, SOLUTION INTRAMUSCULAR; INTRAVENOUS; SUBCUTANEOUS
Status: DISCONTINUED | OUTPATIENT
Start: 2023-12-17 | End: 2023-12-20 | Stop reason: HOSPADM

## 2023-12-17 RX ORDER — FENTANYL CITRATE 50 UG/ML
50 INJECTION, SOLUTION INTRAMUSCULAR; INTRAVENOUS EVERY 5 MIN PRN
Status: DISCONTINUED | OUTPATIENT
Start: 2023-12-17 | End: 2023-12-20 | Stop reason: HOSPADM

## 2023-12-17 RX ORDER — OXYTOCIN 10 [USP'U]/ML
10 INJECTION, SOLUTION INTRAMUSCULAR; INTRAVENOUS
Status: DISCONTINUED | OUTPATIENT
Start: 2023-12-17 | End: 2023-12-17 | Stop reason: HOSPADM

## 2023-12-17 RX ORDER — NALOXONE HYDROCHLORIDE 0.4 MG/ML
0.4 INJECTION, SOLUTION INTRAMUSCULAR; INTRAVENOUS; SUBCUTANEOUS
Status: DISCONTINUED | OUTPATIENT
Start: 2023-12-17 | End: 2023-12-20 | Stop reason: HOSPADM

## 2023-12-17 RX ORDER — SODIUM CHLORIDE, SODIUM LACTATE, POTASSIUM CHLORIDE, CALCIUM CHLORIDE 600; 310; 30; 20 MG/100ML; MG/100ML; MG/100ML; MG/100ML
INJECTION, SOLUTION INTRAVENOUS CONTINUOUS
Status: DISCONTINUED | OUTPATIENT
Start: 2023-12-17 | End: 2023-12-20 | Stop reason: HOSPADM

## 2023-12-17 RX ORDER — MISOPROSTOL 200 UG/1
800 TABLET ORAL
Status: DISCONTINUED | OUTPATIENT
Start: 2023-12-17 | End: 2023-12-17 | Stop reason: HOSPADM

## 2023-12-17 RX ORDER — AMOXICILLIN 250 MG
2 CAPSULE ORAL 2 TIMES DAILY
Status: DISCONTINUED | OUTPATIENT
Start: 2023-12-17 | End: 2023-12-20 | Stop reason: HOSPADM

## 2023-12-17 RX ORDER — OXYCODONE HYDROCHLORIDE 5 MG/1
5 TABLET ORAL EVERY 4 HOURS PRN
Status: DISCONTINUED | OUTPATIENT
Start: 2023-12-17 | End: 2023-12-20 | Stop reason: HOSPADM

## 2023-12-17 RX ORDER — FENTANYL CITRATE 50 UG/ML
25 INJECTION, SOLUTION INTRAMUSCULAR; INTRAVENOUS EVERY 5 MIN PRN
Status: DISCONTINUED | OUTPATIENT
Start: 2023-12-17 | End: 2023-12-20 | Stop reason: HOSPADM

## 2023-12-17 RX ORDER — PROCHLORPERAZINE MALEATE 10 MG
10 TABLET ORAL EVERY 6 HOURS PRN
Status: DISCONTINUED | OUTPATIENT
Start: 2023-12-17 | End: 2023-12-20 | Stop reason: HOSPADM

## 2023-12-17 RX ORDER — HYDROMORPHONE HCL IN WATER/PF 6 MG/30 ML
0.4 PATIENT CONTROLLED ANALGESIA SYRINGE INTRAVENOUS EVERY 5 MIN PRN
Status: DISCONTINUED | OUTPATIENT
Start: 2023-12-17 | End: 2023-12-20 | Stop reason: HOSPADM

## 2023-12-17 RX ORDER — CEFAZOLIN SODIUM/WATER 2 G/20 ML
2 SYRINGE (ML) INTRAVENOUS SEE ADMIN INSTRUCTIONS
Status: DISCONTINUED | OUTPATIENT
Start: 2023-12-17 | End: 2023-12-17 | Stop reason: HOSPADM

## 2023-12-17 RX ORDER — HYDROMORPHONE HCL IN WATER/PF 6 MG/30 ML
0.2 PATIENT CONTROLLED ANALGESIA SYRINGE INTRAVENOUS EVERY 5 MIN PRN
Status: DISCONTINUED | OUTPATIENT
Start: 2023-12-17 | End: 2023-12-20 | Stop reason: HOSPADM

## 2023-12-17 RX ORDER — HYDROCORTISONE 25 MG/G
CREAM TOPICAL 3 TIMES DAILY PRN
Status: DISCONTINUED | OUTPATIENT
Start: 2023-12-17 | End: 2023-12-20 | Stop reason: HOSPADM

## 2023-12-17 RX ORDER — ACETAMINOPHEN 325 MG/1
975 TABLET ORAL EVERY 6 HOURS
Status: DISCONTINUED | OUTPATIENT
Start: 2023-12-17 | End: 2023-12-20 | Stop reason: HOSPADM

## 2023-12-17 RX ORDER — OXYTOCIN/0.9 % SODIUM CHLORIDE 30/500 ML
340 PLASTIC BAG, INJECTION (ML) INTRAVENOUS CONTINUOUS PRN
Status: DISCONTINUED | OUTPATIENT
Start: 2023-12-17 | End: 2023-12-20 | Stop reason: HOSPADM

## 2023-12-17 RX ORDER — MISOPROSTOL 200 UG/1
400 TABLET ORAL
Status: DISCONTINUED | OUTPATIENT
Start: 2023-12-17 | End: 2023-12-17 | Stop reason: HOSPADM

## 2023-12-17 RX ORDER — VITAMIN A ACETATE, .BETA.-CAROTENE, ASCORBIC ACID, CHOLECALCIFEROL, .ALPHA.-TOCOPHEROL ACETATE, DL-, THIAMINE MONONITRATE, RIBOFLAVIN, NIACINAMIDE, PYRIDOXINE HYDROCHLORIDE, FOLIC ACID, CYANOCOBALAMIN, CALCIUM CARBONATE, FERROUS FUMARATE, ZINC OXIDE, AND CUPRIC OXIDE 2000; 2000; 120; 400; 22; 1.84; 3; 20; 10; 1; 12; 200; 27; 25; 2 [IU]/1; [IU]/1; MG/1; [IU]/1; MG/1; MG/1; MG/1; MG/1; MG/1; MG/1; UG/1; MG/1; MG/1; MG/1; MG/1
1 TABLET ORAL DAILY
COMMUNITY

## 2023-12-17 RX ORDER — ONDANSETRON 2 MG/ML
INJECTION INTRAMUSCULAR; INTRAVENOUS PRN
Status: DISCONTINUED | OUTPATIENT
Start: 2023-12-17 | End: 2023-12-17

## 2023-12-17 RX ORDER — AMOXICILLIN 250 MG
1 CAPSULE ORAL 2 TIMES DAILY
Status: DISCONTINUED | OUTPATIENT
Start: 2023-12-17 | End: 2023-12-20 | Stop reason: HOSPADM

## 2023-12-17 RX ORDER — MODIFIED LANOLIN
OINTMENT (GRAM) TOPICAL
Status: DISCONTINUED | OUTPATIENT
Start: 2023-12-17 | End: 2023-12-20 | Stop reason: HOSPADM

## 2023-12-17 RX ORDER — CITRIC ACID/SODIUM CITRATE 334-500MG
30 SOLUTION, ORAL ORAL
Status: COMPLETED | OUTPATIENT
Start: 2023-12-17 | End: 2023-12-17

## 2023-12-17 RX ADMIN — SODIUM CHLORIDE, POTASSIUM CHLORIDE, SODIUM LACTATE AND CALCIUM CHLORIDE: 600; 310; 30; 20 INJECTION, SOLUTION INTRAVENOUS at 18:31

## 2023-12-17 RX ADMIN — TRANEXAMIC ACID 1 G: 10 INJECTION, SOLUTION INTRAVENOUS at 15:49

## 2023-12-17 RX ADMIN — SODIUM CITRATE AND CITRIC ACID MONOHYDRATE 30 ML: 500; 334 SOLUTION ORAL at 16:49

## 2023-12-17 RX ADMIN — ACETAMINOPHEN 975 MG: 325 TABLET ORAL at 23:07

## 2023-12-17 RX ADMIN — BUPIVACAINE HYDROCHLORIDE IN DEXTROSE 1.6 ML: 7.5 INJECTION, SOLUTION SUBARACHNOID at 17:05

## 2023-12-17 RX ADMIN — ACETAMINOPHEN 975 MG: 325 TABLET ORAL at 16:49

## 2023-12-17 RX ADMIN — PHENYLEPHRINE HYDROCHLORIDE 0.5 MCG/KG/MIN: 10 INJECTION INTRAVENOUS at 17:07

## 2023-12-17 RX ADMIN — KETOROLAC TROMETHAMINE 30 MG: 30 INJECTION, SOLUTION INTRAMUSCULAR; INTRAVENOUS at 23:11

## 2023-12-17 RX ADMIN — ONDANSETRON 4 MG: 2 INJECTION INTRAMUSCULAR; INTRAVENOUS at 16:59

## 2023-12-17 RX ADMIN — AZITHROMYCIN MONOHYDRATE 500 MG: 500 INJECTION, POWDER, LYOPHILIZED, FOR SOLUTION INTRAVENOUS at 16:08

## 2023-12-17 RX ADMIN — DOCUSATE SODIUM 50 MG AND SENNOSIDES 8.6 MG 1 TABLET: 8.6; 5 TABLET, FILM COATED ORAL at 22:26

## 2023-12-17 RX ADMIN — PENICILLIN G POTASSIUM 5 MILLION UNITS: 5000000 POWDER, FOR SOLUTION INTRAMUSCULAR; INTRAPLEURAL; INTRATHECAL; INTRAVENOUS at 12:52

## 2023-12-17 RX ADMIN — Medication 2 G: at 16:55

## 2023-12-17 RX ADMIN — BUPIVACAINE HYDROCHLORIDE 20 ML: 2.5 INJECTION, SOLUTION EPIDURAL; INFILTRATION; INTRACAUDAL at 18:29

## 2023-12-17 RX ADMIN — Medication 300 ML/HR: at 17:33

## 2023-12-17 RX ADMIN — Medication 0.15 MG: at 17:05

## 2023-12-17 RX ADMIN — BUPIVACAINE 20 ML: 13.3 INJECTION, SUSPENSION, LIPOSOMAL INFILTRATION at 18:29

## 2023-12-17 RX ADMIN — SODIUM CHLORIDE, POTASSIUM CHLORIDE, SODIUM LACTATE AND CALCIUM CHLORIDE: 600; 310; 30; 20 INJECTION, SOLUTION INTRAVENOUS at 12:51

## 2023-12-17 RX ADMIN — SODIUM CHLORIDE, POTASSIUM CHLORIDE, SODIUM LACTATE AND CALCIUM CHLORIDE 500 ML: 600; 310; 30; 20 INJECTION, SOLUTION INTRAVENOUS at 16:28

## 2023-12-17 ASSESSMENT — ACTIVITIES OF DAILY LIVING (ADL)
ADLS_ACUITY_SCORE: 18
ADLS_ACUITY_SCORE: 35
ADLS_ACUITY_SCORE: 18

## 2023-12-17 NOTE — PLAN OF CARE
Problem: Labor  Goal: Hemostasis  Outcome: Progressing     Problem: Labor  Goal: Stable Fetal Wellbeing  Outcome: Progressing   Goal Outcome Evaluation:                  Patient will remain stable until  section this afternoon

## 2023-12-17 NOTE — PROGRESS NOTES
0920: Patient arrived ambulatory from home stated felt gush of clear fluid at 0745 this AM. No contractions at this time, cervix has never been checked in clinic. Patient pregnancy complicated by GDM in Insulin, patient has implant for insulin in arm and AMA. Has been 10years since last vaginal birth. 12years since Primary C/S for [facility] and 4 vaginal births prior to c/s. Patient very torn on plan for route of delivery with this baby. Lots of bedside communication done on various potential options of inductions/augmentation and how a c/s routine is done and what to expect. Patient still very undecided, POC await for ROM results. Dr Sarabia called and updated on patient status, orders received and updated on patients route of delivery concerns. POC await for ROM results, updated OB with results and SVE and plan for OB to directly discuss with patient following results POC for delivery at that time if indicated.

## 2023-12-17 NOTE — ANESTHESIA PROCEDURE NOTES
"Intrathecal injection Procedure Note    Pre-Procedure   Staff -        Anesthesiologist:  Neftali Johnson MD       Performed By: anesthesiologist       Location: OR       Procedure Start/Stop Times: 12/17/2023 5:05 PM and 12/17/2023 5:10 PM       Pre-Anesthestic Checklist: patient identified, IV checked, risks and benefits discussed, informed consent, monitors and equipment checked, pre-op evaluation, at physician/surgeon's request and post-op pain management  Timeout:       Correct Patient: Yes        Correct Procedure: Yes        Correct Site: Yes        Correct Position: Yes   Procedure Documentation  Procedure: intrathecal injection       Patient Position: sitting       Patient Prep/Sterile Barriers: sterile gloves, mask, patient draped       Skin prep: Chloraprep       Insertion Site: L3-4. (midline approach).       Needle Gauge: 25.        Needle Length (Inches): 4        Spinal Needle Type: Pencan       Introducer used       # of attempts: 1 and  # of redirects:     Assessment/Narrative         Paresthesias: No.       CSF fluid: clear.    Medication(s) Administered   0.75% Hyperbaric Bupivacaine (Intrathecal) - Intrathecal   1.6 mL - 12/17/2023 5:05:00 PM  Morphine PF 1 mg/mL (Intrathecal) - Intrathecal   0.15 mg - 12/17/2023 5:05:00 PM  Medication Administration Time: 12/17/2023 5:05 PM      FOR [facility] ([facility] [place]) ONLY:   Pain Team Contact information: please page the Pain Team Via [employer]. Search \"Pain\". During daytime hours, please page the attending first. At night please page the resident first.      "

## 2023-12-17 NOTE — ANESTHESIA PREPROCEDURE EVALUATION
"Anesthesia Pre-Procedure Evaluation    Patient: Chuyita Walters   MRN: [id number] : 1970        Procedure : Procedure(s):   SECTION          No past medical history on file.   No past surgical history on file.   No Known Allergies   Social History     Tobacco Use    Smoking status: Never    Smokeless tobacco: Never   Substance Use Topics    Alcohol use: Not on file      Wt Readings from Last 1 Encounters:   23 85.7 kg (189 lb)        Anesthesia Evaluation            ROS/MED HX  ENT/Pulmonary:  - neg pulmonary ROS     Neurologic:  - neg neurologic ROS     Cardiovascular:  - neg cardiovascular ROS     METS/Exercise Tolerance: >4 METS    Hematologic:  - neg hematologic  ROS     Musculoskeletal:  - neg musculoskeletal ROS     GI/Hepatic:  - neg GI/hepatic ROS     Renal/Genitourinary:  - neg Renal ROS     Endo:  - neg endo ROS     Psychiatric/Substance Use:  - neg psychiatric ROS     Infectious Disease:  - neg infectious disease ROS     Malignancy:  - neg malignancy ROS     Other:  - neg other ROS          Physical Exam    Airway        Mallampati: II    Neck ROM: full     Respiratory Devices and Support         Dental           Cardiovascular   cardiovascular exam normal          Pulmonary   pulmonary exam normal                OUTSIDE LABS:  CBC:   Lab Results   Component Value Date    WBC 11.4 (H) 2023    HGB 13.5 2023    HCT 40.9 2023     2023     BMP:   Lab Results   Component Value Date     2023    POTASSIUM 4.2 2023    CHLORIDE 105 2023    CO2 22 2023    BUN 12.7 2023    CR 0.54 2023    GLC 81 2023     COAGS: No results found for: \"PTT\", \"INR\", \"FIBR\"  POC: No results found for: \"BGM\", \"HCG\", \"HCGS\"  HEPATIC:   Lab Results   Component Value Date    ALBUMIN 3.4 (L) 2023    PROTTOTAL 6.6 2023    ALT 17 2023    AST 28 2023    ALKPHOS 108 2023    BILITOTAL 0.3 2023     OTHER:   Lab " Results   Component Value Date    LUDWIN 9.0 12/17/2023       Anesthesia Plan    ASA Status:  2       Anesthesia Type: Spinal.              Consents    Anesthesia Plan(s) and associated risks, benefits, and realistic alternatives discussed. Questions answered and patient/representative(s) expressed understanding.     - Discussed:     - Discussed with:  Patient            Postoperative Care            Comments:               Neftali Johnson MD    I have reviewed the pertinent notes and labs in the chart from the past 30 days and (re)examined the patient.  Any updates or changes from those notes are reflected in this note.          # Hypoalbuminemia: Lowest albumin = 3.4 g/dL at 12/17/2023 11:24 AM, will monitor as appropriate    # Drug Induced Platelet Defect: home medication list includes an antiplatelet medication

## 2023-12-17 NOTE — PROGRESS NOTES
Patient consents signed, nozin, ting cleanse and shave performed. Additional cloths given and patient requesting to cleanse self from neck to knees front and back in restroom next time she gets up to void while avoiding the breasts. Will return within 1hr from surgery to initiate abx, txa, tylenol, and Bicitra.

## 2023-12-18 LAB
HGB BLD-MCNC: 12.4 G/DL (ref 11.7–15.7)
T PALLIDUM AB SER QL: NONREACTIVE

## 2023-12-18 PROCEDURE — 36415 COLL VENOUS BLD VENIPUNCTURE: CPT | Performed by: OBSTETRICS & GYNECOLOGY

## 2023-12-18 PROCEDURE — 120N000001 HC R&B MED SURG/OB

## 2023-12-18 PROCEDURE — 85018 HEMOGLOBIN: CPT | Performed by: OBSTETRICS & GYNECOLOGY

## 2023-12-18 PROCEDURE — 250N000013 HC RX MED GY IP 250 OP 250 PS 637: Performed by: OBSTETRICS & GYNECOLOGY

## 2023-12-18 PROCEDURE — 250N000011 HC RX IP 250 OP 636: Performed by: OBSTETRICS & GYNECOLOGY

## 2023-12-18 RX ORDER — POLYETHYLENE GLYCOL 3350 17 G/17G
17 POWDER, FOR SOLUTION ORAL DAILY PRN
Status: DISCONTINUED | OUTPATIENT
Start: 2023-12-18 | End: 2023-12-19

## 2023-12-18 RX ADMIN — DOCUSATE SODIUM 50 MG AND SENNOSIDES 8.6 MG 1 TABLET: 8.6; 5 TABLET, FILM COATED ORAL at 08:28

## 2023-12-18 RX ADMIN — IBUPROFEN 800 MG: 800 TABLET ORAL at 17:08

## 2023-12-18 RX ADMIN — KETOROLAC TROMETHAMINE 30 MG: 30 INJECTION, SOLUTION INTRAMUSCULAR; INTRAVENOUS at 11:23

## 2023-12-18 RX ADMIN — DOCUSATE SODIUM 50 MG AND SENNOSIDES 8.6 MG 2 TABLET: 8.6; 5 TABLET, FILM COATED ORAL at 23:01

## 2023-12-18 RX ADMIN — IBUPROFEN 800 MG: 800 TABLET ORAL at 23:01

## 2023-12-18 RX ADMIN — ACETAMINOPHEN 975 MG: 325 TABLET ORAL at 17:08

## 2023-12-18 RX ADMIN — POLYETHYLENE GLYCOL 3350 17 G: 17 POWDER, FOR SOLUTION ORAL at 17:46

## 2023-12-18 RX ADMIN — KETOROLAC TROMETHAMINE 30 MG: 30 INJECTION, SOLUTION INTRAMUSCULAR; INTRAVENOUS at 05:07

## 2023-12-18 RX ADMIN — ACETAMINOPHEN 975 MG: 325 TABLET ORAL at 11:22

## 2023-12-18 RX ADMIN — ACETAMINOPHEN 975 MG: 325 TABLET ORAL at 23:01

## 2023-12-18 RX ADMIN — ACETAMINOPHEN 975 MG: 325 TABLET ORAL at 05:06

## 2023-12-18 ASSESSMENT — ACTIVITIES OF DAILY LIVING (ADL)
ADLS_ACUITY_SCORE: 18

## 2023-12-18 NOTE — PROGRESS NOTES
12/18/23   Chuyita Walters   1970    Postpartum Rounding Note    Subjective: Patient doing well. She is tolerating general diet, urinating without difficulty, and ambulating without lightheadedness. She is breastfeeding and pumping. Vaginal bleeding is within normal limits.      Vital signs:  Temp: 97.8  F (36.6  C) Temp src: Oral BP: 106/61 Pulse: 74   Resp: 18 SpO2: 99 % O2 Device: None (Room air)        Physical Exam:  General:   no distress  Abdomen: nontender, uterine fundus firm below umbilicus, incision intact  Extremities: no calf pain, no edema in legs; varicosities stable      Labs: all recent labs reviewed      Assessment/Plan: 53 year old PPD#1 s/p RCS  1. Postpartum    - afebrile, VSS  - continue postpartum cares  2. GDMA2   - discussed that the patient does not need to check BG any longer, she prefers to continue wearing CGM and will remove in the next few days   - instead of 2h GTT at 6w PP, per patient, [facility] offered the patient use her remaining CGM for 10 days at that time and she will plan for this  3. Varicose Veins   - continue compression socks    Discussed plan of care with patient, and patient expressed understanding. Opportunity for questions given, and all questions were answered.    Disposition: plan for discharge pending progress      Makeda Sarabia MD

## 2023-12-18 NOTE — PLAN OF CARE
Problem: Postpartum ( Delivery)  Goal: Hemostasis  Outcome: Not Progressing  Goal: Fluid and Electrolyte Balance  Outcome: Not Progressing  Goal: Optimal Pain Control and Function  Outcome: Not Progressing  Goal: Effective Urinary Elimination  Outcome: Not Progressing   Goal Outcome Evaluation:       VSS, denies pain with use of tylenol and toradol. Fundus is firm and bleeding is scant. Pts first void after removal of perkins was at 0200 and voided only 150cc, encouraged pt to drink more fluid. At 0400 pt voided 350cc, When writer did fundus check at 0500 pt felt pressure as if she needed to void. Pt at that time void 300cc more and then at 0518 voided 200cc. Writer then bladder scanned pt at 0525 and showed 1422cc , Writer educated pt on bladder management protocol and told pt she needed to be straight cath immediately. Pt asked to use toilet again and at 0535 void 825cc, Bladder scanned for 722cc at 0545. Pt voided again at 0550 for 800cc and then bladder scanned for 218cc at 0554. Pt voided again at approx 0650 for 500cc and bladder scanned for 58cc afterwards.

## 2023-12-18 NOTE — ANESTHESIA POSTPROCEDURE EVALUATION
Patient: Chuyita Walters    Procedure: Procedure(s):   SECTION       Anesthesia Type:  Spinal    Note:  Disposition: Inpatient   Postop Pain Control: Uneventful            Sign Out: Well controlled pain   PONV: No   Neuro/Psych: Uneventful            Sign Out: Acceptable/Baseline neuro status   Airway/Respiratory: Uneventful            Sign Out: Acceptable/Baseline resp. status   CV/Hemodynamics: Uneventful            Sign Out: Acceptable CV status; No obvious hypovolemia; No obvious fluid overload   Other NRE: NONE   DID A NON-ROUTINE EVENT OCCUR? No           Last vitals:  Vitals:    23   BP:  133/60    Pulse:      Resp:      Temp:      SpO2: 98%  98%       Electronically Signed By: Eryn Saldivar MD  2023  9:13 PM

## 2023-12-18 NOTE — ANESTHESIA CARE TRANSFER NOTE
Patient: Chuyita Walters    Procedure: Procedure(s):   SECTION       Diagnosis: Leakage of amniotic fluid [O42.90]  Diagnosis Additional Information: No value filed.    Anesthesia Type:   Spinal     Note:    Oropharynx: oropharynx clear of all foreign objects  Level of Consciousness: awake  Oxygen Supplementation: room air    Independent Airway: airway patency satisfactory and stable  Dentition: dentition unchanged  Vital Signs Stable: post-procedure vital signs reviewed and stable  Report to RN Given: handoff report given  Patient transferred to: Labor and Delivery    Handoff Report: Identifed the Patient, Identified the Reponsible Provider, Reviewed the pertinent medical history, Discussed the surgical course, Reviewed Intra-OP anesthesia mangement and issues during anesthesia, Set expectations for post-procedure period and Allowed opportunity for questions and acknowledgement of understanding      Vitals:  Vitals Value Taken Time   /68 23 1844   Temp 36.6  C (97.9  F) 23 184   Pulse 71 23 1844   Resp 14 23 1844   SpO2 98 % 23       Electronically Signed By: GAGAN Peterson CRNA  2023  6:45 PM

## 2023-12-18 NOTE — PROGRESS NOTES
Notified MD at 1051 PM regarding  check if orders needed for blood glucose check .      Spoke with: Dr Sarabia    Orders were not obtained.    Comments: Per MD no blood glucose checks needed for mother.

## 2023-12-18 NOTE — PLAN OF CARE
Data: Vital signs within normal limits. Postpartum checks within normal limits - see flow record. Patient eating and drinking normally. Patient able to had perkins removed at 2200 and is up ambulating with assistance. No apparent signs of infection. Incision healing well. Patient performing self cares and is able to care for infant.  Action: Patient not medicated patient stated she was comfortable.   Response: Positive attachment behaviors observed with infant. Support persons is present.   Plan: PP bladder management.     Problem: Postpartum ( Delivery)  Goal: Hemostasis  Outcome: Progressing  Goal: Fluid and Electrolyte Balance  Outcome: Progressing  Goal: Absence of Infection Signs and Symptoms  Outcome: Progressing

## 2023-12-18 NOTE — PLAN OF CARE
"  Problem: Adult Inpatient Plan of Care  Goal: Plan of Care Review  Description: The Plan of Care Review/Shift note should be completed every shift.  The Outcome Evaluation is a brief statement about your assessment that the patient is improving, declining, or no change.  This information will be displayed automatically on your shift  note.  Outcome: Progressing  Flowsheets (Taken 12/18/2023 0854)  Plan of Care Reviewed With:   patient   spouse  Overall Patient Progress: improving  Goal: Patient-Specific Goal (Individualized)  Description: You can add care plan individualizations to a care plan. Examples of Individualization might be:  \"Parent requests to be called daily at 9am for status\", \"I have a hard time hearing out of my right ear\", or \"Do not touch me to wake me up as it startles  me\".  Outcome: Progressing  Goal: Absence of Hospital-Acquired Illness or Injury  Outcome: Progressing  Intervention: Prevent Skin Injury  Recent Flowsheet Documentation  Taken 12/18/2023 0755 by Katie Lyle RN  Body Position: position changed independently  Intervention: Prevent Infection  Recent Flowsheet Documentation  Taken 12/18/2023 0755 by Katie Lyle RN  Infection Prevention: hand hygiene promoted  Goal: Optimal Comfort and Wellbeing  Outcome: Progressing  Intervention: Provide Person-Centered Care  Recent Flowsheet Documentation  Taken 12/18/2023 0755 by Katie Lyle RN  Trust Relationship/Rapport:   care explained   choices provided   questions answered   questions encouraged   reassurance provided  Goal: Readiness for Transition of Care  Outcome: Progressing   Goal Outcome Evaluation:      Plan of Care Reviewed With: patient, spouse    Overall Patient Progress: improvingOverall Patient Progress: improving    Mom is feeling well but tired since she did not sleep very much last night.  She has been encouraged to empty her bladder often so we can measure her output and bladder scan to ensure adequate " emptying.  She has also been encouraged to rest when she can, eat, and drink fluids.  Her  is very attentive and supportive.  Vitals and hemoglobin are stable.  Fundus is firm without massage.  Lochia is light.   incision is open to air with liquid bandage on it.  There is no drainage and it is approximated and dry.  Pain is mild at about 2-3 and she is taking scheduled Toradol and Tylenol for pain.  She is eating a regular diet and keeping an eye on her own stable blood sugars using her dexcom anette.

## 2023-12-18 NOTE — ANESTHESIA PROCEDURE NOTES
TAP Procedure Note    Pre-Procedure   Staff -        Anesthesiologist:  Eryn Saldivar MD       Performed By: anesthesiologist       Location: OR       Procedure Start/Stop Times: 12/17/2023 6:29 PM and 12/17/2023 6:34 PM       Pre-Anesthestic Checklist: patient identified, IV checked, site marked, risks and benefits discussed, informed consent, monitors and equipment checked, pre-op evaluation, at physician/surgeon's request and post-op pain management  Timeout:       Correct Patient: Yes        Correct Procedure: Yes        Correct Site: Yes        Correct Position: Yes        Correct Laterality: Yes        Site Marked: Yes  Procedure Documentation  Procedure: TAP       Laterality: bilateral       Patient Position: supine       Patient Prep/Sterile Barriers: sterile gloves, mask, patient draped       Skin prep: Chloraprep       Insertion Site: T11-12.       Needle Type: short bevel       Needle Gauge: 21.        Needle Length (Inches): 4        Ultrasound guided       1. Ultrasound was used to identify targeted nerve, plexus, vascular marker, or fascial plane and place a needle adjacent to it in real-time.       2. Ultrasound was used to visualize the spread of anesthetic in close proximity to the above referenced structure.       3. A permanent image is entered into the patient's record.       4. The visualized anatomic structures appeared normal.       5. There were no apparent abnormal pathologic findings.    Assessment/Narrative         The placement was negative for: blood aspirated, painful injection and site bleeding       Paresthesias: No.       Bolus given via needle. no blood aspirated via catheter.        Secured via.        Insertion/Infusion Method: Single Shot       Complications: none       Injection made incrementally with aspirations every 5 mL.       Sensory Level Left: T10 and T7.       Sensory Level Right: T10 and T7.    Medication(s) Administered   Bupivacaine 0.25% PF  "(Infiltration) - Infiltration   20 mL - 12/17/2023 6:29:00 PM  Bupivacaine liposome (Exparel) 1.3% LA inj susp (Infiltration) - Infiltration   20 mL - 12/17/2023 6:29:00 PM  Medication Administration Time: 12/17/2023 6:29 PM     Comments:  Facile placement, bilateral.  Single attempt, single pass.  Patient tolerated well and without complication.    Eryn Saldivar MD       FOR [facility] [place] ONLY:   Pain Team Contact information: please page the Pain Team Via [employer]. Search \"Pain\". During daytime hours, please page the attending first. At night please page the resident first.      "

## 2023-12-18 NOTE — OP NOTE
Section Operative Note   23     Patient: Chuyita Walters 1970     Procedure: Procedure(s):   SECTION     Surgeon(s): Makeda Sarabia MD    Pre-operative Diagnosis:   1. 53 year old  with IUP @ 36w3d  2. SROM  3. Breech  4. GDMA2  5. AMA  6. H/o CS x1  7. Varicose veins  8. Low maternal weight gain with normal EFW    Post-operative Diagnosis:   Same plus:   male living infant     Anestheisa: spinal, TAP blocks    Antibiotics: 2g Ancef IV; 500mg Azithromycin IV (also received PCN 4h prior to delivery as GBS unknown and )    QBL: 853cc    UOP: clear urine in Perkins bag after procedure    IV Fluids: 1000ml crystalloid    Specimens: placenta    Complications: none    Findings:  male infant in breech presentation  Apgar 1 min: 8  Apgar 5 min: 9  Weight: 2920g (6lb 7oz)     team present at delivery: yes    Nuchal cord: none  Amniotic fluid: clear  Placenta: spontaneous removal  Maternal anatomy: uterus, fallopian tubes, and ovaries appeared normal    Procedure Description:  After informed consent was obtained, the patient was brought to the operating room with IV in place. Spinal and perkins placed in OR. The patient was placed on the operating table in the dorsal supine position with a leftward tilt and fetal heart tones were confirmed. The patient was prepped (including vaginal prep) and draped in usual sterile fashion. Antibiotics were given prior to incision. TXA had been given prior to OR due to grand multiparity.   A Pfannenstiel skin incision was made with the scalpel. This incision was carried down through the subcutaneous tissue to the fascia sharply. The fascia was then incised in the midline and this incision was extended laterally with Ortega scissors. Using two Kocher clamps, the fascia was elevated and the underlying rectus muscles were dissected off bluntly and with Ortega scissors both superiorly and inferiorly. The rectus muscles were  in the midline and  the peritoneum was identified and entered. The peritoneal incision was extended with traction with good visualization of the bowel and bladder. The bladder blade was placed. The vesicouterine peritoneal reflection was taken down sharply and the bladder bluntly dissected off of the lower uterine segment. A low transverse uterine incision was made with the scalpel. The incision was extended laterally with digital traction. The bladder blade was removed and the infant was delivered as noted in findings above. The oropharynx and nares were bulb suctioned and the umbilical cord was doubly clamped and cut after 60 second delay. The infant was handed off to a waiting nurse and NNP. Appropriate specimens were obtained as noted above. The placenta was removed as noted above.  The uterus was exteriorized and cleared of clot and debris. The uterine incision was reappoximated with 0 PDS in a running stitch followed by 0 PDS in a running imbricating stitch for a 2 layer closure. Hemostasis was observed. The serosa was then reapproximated with 3-0 vicryl in a running horizontal imbricating stitch. The posterior cul-de-sac was irrigated and suctioned. The uterus was returned to anatomic position. The pericolic gutters were irrigated and suctioned. The uterine incision was reinspected and noted to be hemostatic. The parietal peritoneum was reapproximated with 3-0 Vicryl in a running stitch which imbricated the rough edges outside of the abdomen. The rectus muscles were reapproximated with 0 PDS in  horizontal mattress stitches. The subfascial tissues and rectus muscles were examined, made hemostatic with cautery, inspected, and found to be hemostatic. The fascia was reapproximated with 0 looped PDS in a running stitch. The subcutaneous tissue was irrigated and made hemostatic with the Bovie.  The subcutaneous tissue was reapproximated in 3 layers with 3-0 Vicryl in running horizontal mattress stitches.  The skin incision was  reapproximated with 4-0 Vicryl and covered with skin glue.   Fundal pressure was used to express all vaginal clots. The patient tolerated the procedure well.  Instrument, sponge, and needle counts were correct x 3.  The patient was taken to the recovery room in stable condition.  The baby remained with the patient.     Makeda Sarabia MD

## 2023-12-19 PROCEDURE — 250N000013 HC RX MED GY IP 250 OP 250 PS 637: Performed by: INTERNAL MEDICINE

## 2023-12-19 PROCEDURE — 120N000001 HC R&B MED SURG/OB

## 2023-12-19 PROCEDURE — 250N000013 HC RX MED GY IP 250 OP 250 PS 637: Performed by: OBSTETRICS & GYNECOLOGY

## 2023-12-19 RX ORDER — BISACODYL 10 MG
10 SUPPOSITORY, RECTAL RECTAL DAILY
Status: DISCONTINUED | OUTPATIENT
Start: 2023-12-19 | End: 2023-12-20 | Stop reason: HOSPADM

## 2023-12-19 RX ORDER — POLYETHYLENE GLYCOL 3350 17 G/17G
17 POWDER, FOR SOLUTION ORAL DAILY
Status: DISCONTINUED | OUTPATIENT
Start: 2023-12-19 | End: 2023-12-20 | Stop reason: HOSPADM

## 2023-12-19 RX ADMIN — ACETAMINOPHEN 975 MG: 325 TABLET ORAL at 05:56

## 2023-12-19 RX ADMIN — BISACODYL 10 MG: 10 SUPPOSITORY RECTAL at 13:36

## 2023-12-19 RX ADMIN — IBUPROFEN 800 MG: 800 TABLET ORAL at 18:30

## 2023-12-19 RX ADMIN — IBUPROFEN 800 MG: 800 TABLET ORAL at 05:56

## 2023-12-19 RX ADMIN — ACETAMINOPHEN 975 MG: 325 TABLET ORAL at 12:00

## 2023-12-19 RX ADMIN — ACETAMINOPHEN 975 MG: 325 TABLET ORAL at 18:30

## 2023-12-19 RX ADMIN — POLYETHYLENE GLYCOL 3350 17 G: 17 POWDER, FOR SOLUTION ORAL at 13:36

## 2023-12-19 RX ADMIN — DOCUSATE SODIUM 50 MG AND SENNOSIDES 8.6 MG 2 TABLET: 8.6; 5 TABLET, FILM COATED ORAL at 08:10

## 2023-12-19 RX ADMIN — IBUPROFEN 800 MG: 800 TABLET ORAL at 12:00

## 2023-12-19 RX ADMIN — DOCUSATE SODIUM 50 MG AND SENNOSIDES 8.6 MG 2 TABLET: 8.6; 5 TABLET, FILM COATED ORAL at 20:19

## 2023-12-19 ASSESSMENT — ACTIVITIES OF DAILY LIVING (ADL)
ADLS_ACUITY_SCORE: 18

## 2023-12-19 NOTE — PROGRESS NOTES
12/19/23   Chuyita Walters   1970    Postpartum Rounding Note    Subjective: Patient doing well. She is tolerating general diet, urinating without difficulty, and ambulating without lightheadedness. She is breastfeeding and pumping. Vaginal bleeding is within normal limits. She has not had a bowel movement and feels very uncomfortable with constipation. She also has concerns with how breastfeeding is going and would like a lactation consult.    Vital Signs:  Vitals:    12/18/23 1328 12/18/23 1610 12/19/23 0030 12/19/23 0800   BP: 106/61 95/64 116/65 103/53   BP Location: Right arm Right arm Right arm Right arm   Patient Position:   Semi-Cartagena's Semi-Cartagena's   Cuff Size:   Adult Regular Adult Regular   Pulse: 74 77 74 78   Resp: 18 18 16 16   Temp: 97.8  F (36.6  C) 98.4  F (36.9  C) 98.2  F (36.8  C) 98  F (36.7  C)   TempSrc: Oral Oral Oral Oral   SpO2: 99% 97% 98% 98%   Weight:       Height:         Physical Exam:  General:   no distress  Abdomen: soft, non-tender, non-distended, uterine fundus firm below umbilicus, incision intact  Extremities: no calf pain, no edema in legs; varicosities stable    Recent Labs   Lab Test 12/18/23  0632 12/17/23  1124   WBC  --  11.4*   HGB 12.4 13.5   HCT  --  40.9   PLT  --  280   NA  --  137   CR  --  0.54   AST  --  28   ALT  --  17       Assessment/Plan: 53 year old PPD#2 s/p RCS  1. Postpartum    - afebrile, VSS  - continue postpartum cares  2. GDMA2   - no further accuchecks  - pt prefers to continue wearing CGM and will remove in the next few days   - instead of 2h GTT at 6w PP, per patient, M offered the patient use her remaining CGM for 10 days at that time and she will plan for this  3. Varicose Veins   - continue compression socks    Discussed plan of care with patient, and patient expressed understanding. Opportunity for questions given, and all questions were answered.    Disposition: plan for discharge home tomorrow pending progress      Kristal Oliver MD

## 2023-12-19 NOTE — LACTATION NOTE
This note was copied from a baby's chart.  Reason for initial lactation visit:LPI infant.  Discuss home feeding plan.       Breastfeeding goals:Exclusive breastfeeding.    Prior breastfeeding experience:     Breast pump for home use:Breast fed 6 other children.  Never has used a breast pump or bottle fed her children.  Only breast fed.        Health/Delivery history that may affect breastfeeding:    Breastfeeding since birth:Infant had breast fed well the first 24 hours, then became sleepy.  Has been supplemented with colostrum via cup after breastfeeding attempts.       Education:See care plan below.  Reviewed in detail.       1500-This writer entered room.  Infant just finishing a bath.  Infant was attempting to latch onto the breast in the cradle hold.  Infant chin was on his chest with a shallow latch and infant quickly fell asleep.      Chuyita is crying and unable to speak to inform this writer as to what is wrong.  FOB was able to speak up and let this writer know that Chuyita is very tired and infant is struggling to latch onto the breast and they both wonder how they can do everything (breast feed, supplement, pump) when they go home.      Feeding ended at the breast.  Parents assured that infant had just cup fed 10 ml colostrum within the past 30 minutes.      This writer spent 60+ minutes educating and assisting with breastfeeding plan.    Education given on:  -Infant needs to be effective at the breast.  If infant is sleepy, not swallowing and falls asleep quickly, infant has not received food and the breasts have not been stimulated.  End feeding at breast.      -breast lift, reverse pressure softening, hand expression.  -Use of breast pump on Maintain.  Now pumping 17 ml per session.    -How to obtain the hospital grade breast pump at discharge.  (See lactation consultant)  -Paced bottle feeding.  FOB prefers cup feeding.  Encouraged to practive bottle feeding, prn.    -Assessing proper fit of  "flanges.  Used 27 mm flange.  Nipples rubbing on most sides by 5\" into session.  Will try 30 mm flange, then decide which is most effective in draining the breast.    -Chuyita MAY choose to pump and bottle only, overnight tonight, as she is so very tired and infant is struggling to stay awake at the breast, at this time.      In summary:   Chuyita to soften areolar tissue with techniques above, prn, prior to latching/ pumping.    When infant is no longer effectively removing milk from breast, end feeding at breast.   Infant to supplement EBM via cup/ bottle, as infant cues.  Chuyita to pump on Maintain program (once she's pumped 20 ml x 3 sessions in a row).  She will pump to drain breasts.    Chuyita to continue to pump and bottle/ cup feed until infant is able to effectively drain the breast well each feeding and gain weight.  Encouraged to follow up at outpt lactation clinic, prn.    Chuyita to rent a HGP at discharge.  She will sign the non-coverage form and the rental agreement.      Chuyita verbalizes understanding of all education given.  She denies any further questions.  Lactation to follow up tomorrow, prn.         Care Plan Breastfeeding Late /Low Birth Weight Baby   May struggle to sustain a latch due to thinner fat pads in cheeks  May have decreased energy to stay at breast long enough to get enough milk  Decreased milk transfer over time will result in infant weight loss and low milk supply    Feeding Plan  Hand express colostrum and spoon or finger feed to baby before feedings, as needed to waken.  Breastfeed 8-12+ times in 24 hours  Watch for:   Rhythmic sucking and swallowing during feeding  Content baby after feeding  Adequate wet & dirty diapers (per feeding log)    Supplement If infant does not latch or is sleepy at breast, end breastfeeding and feed expressed milk using the amounts below as a guideline; give more as baby cues. If necessary, make up the difference with donor milk or formula as a bridge " until milk supply increases:    0-24 hours 2-10 ml each feeding  24-48 hours 5-15 ml each feeding  48-72 hours 15-30 ml each feeding  72-96 hours 30-60 ml each feeding    Pump after feedings to stimulate milk production until milk supply well established & baby breastfeeding with rhythmic sucking and swallowing (10-20 minutes), adequate output, and weight gain.    Contact Outpatient Lactation after discharge as needed for follow up.  [phone]                 12/2021

## 2023-12-19 NOTE — PLAN OF CARE
"  Problem: Postpartum ( Delivery)  Goal: Effective Bowel Elimination  Outcome: Progressing  Goal: Optimal Pain Control and Function  Outcome: Progressing  Intervention: Prevent or Manage Pain  Recent Flowsheet Documentation  Taken 2023 1600 by Carol Nagel RN  Perineal Care:   perineum cleansed   perineal spray bottle/warm water use encouraged  Goal: Effective Urinary Elimination  Outcome: Progressing  Intervention: Monitor and Manage Urinary Retention  Recent Flowsheet Documentation  Taken 2023 1600 by Carol Nagel RN  Urinary Elimination Promotion:   frequent voiding encouraged   toileting offered   Goal Outcome Evaluation:       VSS, voiding and ambulating independently. Frequent voiding encouraged to keep bladder empty and promote firm fundus. FFU/1 with scnat lochia and no clots. Denies pain, getting scheduled Tylenol and Ibuprofen. Endorses some constipation for which pt requests Miralax, called Dr. Sarabia to obtain order. Also encouraged pt to have fiber rich foods, stay hydrated and ambulate in anderson when able. Agrees with plan now to pump breasts after each feeding with baby in order to promote and establish milk supply and supplement LPI baby with 5.9%% weight loss at 24 hours. Appreciated lactation consult as she has never had an LPI baby, or used a pump before this delivery. Supportive spouse at bedside attending to pt's requests and helping to keep pump supplies and bottles washed.       Pt reporting feeling more \"sore\" around , RN agreed that tap blocks can begin to wear off between 24 and 72 hours. Encouraged pt to stay ahead of the pain and her next scheduled medication time is 2300 for Tylenol and Ibuprofen. Ambulating in and[place] and then planning to feed baby and get rest between feedings throughout the night.  very helpful and supportive of pt getting as much rest as possible.           "

## 2023-12-19 NOTE — PLAN OF CARE
Problem: Adult Inpatient Plan of Care  Goal: Optimal Comfort and Wellbeing  2023 1002 by Kesha Camarena RN  Outcome: Progressing  Intervention: Monitor Pain and Promote Comfort  Recent Flowsheet Documentation  Taken 2023 0800 by Kesha Camarena RN  Pain Management Interventions: rest  Intervention: Provide Person-Centered Care  Recent Flowsheet Documentation  Taken 2023 0800 by Kesha Camarena RN  Trust Relationship/Rapport:   care explained   choices provided   questions answered   questions encouraged   reassurance provided   empathic listening provided   thoughts/feelings acknowledged     Problem: Postpartum ( Delivery)  Goal: Optimal Pain Control and Function  Outcome: Progressing  Intervention: Prevent or Manage Pain  Recent Flowsheet Documentation  Taken 2023 0800 by Kesha Camarena RN  Pain Management Interventions: rest  Perineal Care:   perineum cleansed   perineal hygiene encouraged     Patient assessments and vitals are WDL. Pt denies headache, visual changes, dizziness, and epigastric pain. Fundus firm without massage. Incision is open to air, no drainage. Pain has been adequately controlled by tylenol and ibuprofen. Spouse is at bedside and supportive of pt. Pt and spouse are attentive to infant and active in cares. Pt is breastfeeding baby and supplementing with maternal expressed pump milk via cup q2-3 hours. Mom is pumping independently - educated on reverse pressure softening and not dialing the suction too high as it may sometimes close the ducts. made mom a pumping bra. Patient is up ad so. Tolerating fluids and foods - voiding without difficulty. Plans to discharge Wednesday or Thursday pening baby's feedings.

## 2023-12-19 NOTE — PLAN OF CARE
Patient is managing pain with scheduled Ibuprofen and Tylenol.   Postpartum assessment WNL. Incision open to air.   Patient teary at the start of the shift due to baby feeding issues and the need to pump and cup feed. Support offered with each feeding through the night. Patient and  more relaxed with feeding support and express appreciation for time and care provided.   Patient is hopeful for discharge to home Wednesday, possibly Thursday depending on baby's needs.  Still needs to complete mood assessment and birth certificate.     Problem: Postpartum ( Delivery)  Goal: Hemostasis  Outcome: Progressing  Goal: Optimal Pain Control and Function  Outcome: Progressing  Intervention: Prevent or Manage Pain  Recent Flowsheet Documentation  Taken 2023 by Miranda Howard RN  Perineal Care:   perineum cleansed   perineal spray bottle/warm water use encouraged     Problem: Postpartum ( Delivery)  Goal: Successful Parent Role Transition  Outcome: Met  Intervention: Support Parent Role Transition  Recent Flowsheet Documentation  Taken 2023 by Miranda Howard, RN  Supportive Measures:   active listening utilized   goal-setting facilitated   positive reinforcement provided  Parent-Child Attachment Promotion:   parent/caregiver presence encouraged   strengths emphasized  Goal: Nausea and Vomiting Relief  Outcome: Met  Goal: Effective Urinary Elimination  Outcome: Met  Intervention: Monitor and Manage Urinary Retention  Recent Flowsheet Documentation  Taken 2023 by Miranda Howard, RN  Urinary Elimination Promotion:   frequent voiding encouraged   toileting offered

## 2023-12-20 VITALS
SYSTOLIC BLOOD PRESSURE: 113 MMHG | BODY MASS INDEX: 26.34 KG/M2 | OXYGEN SATURATION: 96 % | TEMPERATURE: 98.4 F | DIASTOLIC BLOOD PRESSURE: 66 MMHG | HEART RATE: 98 BPM | RESPIRATION RATE: 16 BRPM | HEIGHT: 70 IN | WEIGHT: 184 LBS

## 2023-12-20 PROCEDURE — 250N000013 HC RX MED GY IP 250 OP 250 PS 637: Performed by: OBSTETRICS & GYNECOLOGY

## 2023-12-20 RX ORDER — DOCUSATE SODIUM 100 MG/1
100 CAPSULE, LIQUID FILLED ORAL 2 TIMES DAILY
Qty: 30 CAPSULE | Refills: 1 | Status: SHIPPED | OUTPATIENT
Start: 2023-12-20

## 2023-12-20 RX ORDER — OXYCODONE HYDROCHLORIDE 5 MG/1
5 TABLET ORAL EVERY 6 HOURS PRN
Qty: 12 TABLET | Refills: 0 | Status: SHIPPED | OUTPATIENT
Start: 2023-12-20 | End: 2023-12-23

## 2023-12-20 RX ORDER — IBUPROFEN 800 MG/1
800 TABLET, FILM COATED ORAL EVERY 8 HOURS PRN
Qty: 21 TABLET | Refills: 0 | Status: SHIPPED | OUTPATIENT
Start: 2023-12-20

## 2023-12-20 RX ADMIN — IBUPROFEN 800 MG: 800 TABLET ORAL at 00:24

## 2023-12-20 RX ADMIN — IBUPROFEN 800 MG: 800 TABLET ORAL at 06:58

## 2023-12-20 RX ADMIN — IBUPROFEN 800 MG: 800 TABLET ORAL at 13:05

## 2023-12-20 RX ADMIN — ACETAMINOPHEN 975 MG: 325 TABLET ORAL at 06:58

## 2023-12-20 RX ADMIN — ACETAMINOPHEN 975 MG: 325 TABLET ORAL at 13:05

## 2023-12-20 RX ADMIN — ACETAMINOPHEN 975 MG: 325 TABLET ORAL at 00:24

## 2023-12-20 RX ADMIN — DOCUSATE SODIUM 50 MG AND SENNOSIDES 8.6 MG 2 TABLET: 8.6; 5 TABLET, FILM COATED ORAL at 08:18

## 2023-12-20 ASSESSMENT — ACTIVITIES OF DAILY LIVING (ADL)
ADLS_ACUITY_SCORE: 18

## 2023-12-20 NOTE — PLAN OF CARE
Problem: Postpartum ( Delivery)  Goal: Successful Parent Role Transition  Intervention: Support Parent Role Transition      Problem: Postpartum ( Delivery)  Goal: Optimal Pain Control and Function  Outcome: Progressing     Pt. Vital signs stable. Pt. Is up ad so. Py. Used breast pump this shift and feeding  with breast milk.  Lochia is scant. No clots noted. Incision is open to air and clean, dry, and intact.  Pt. C/o 2/10  pain to incision line. Pt. Received scheduled tylenol and ibuprofen. Bowel sounds audible. Pt. Stated she had a BM. Pt. Tolerating PO intake. Pt. Denied nausea/vomiting. Pt. Voiding without difficulties. Pt. Attentive to  and cares. Pt. Bonding well with . Significant Other at bedside and supportive.  Pt. Looking forward to going home today.

## 2023-12-20 NOTE — PROGRESS NOTES
[facility] RN Care Coordinator Note    Chuyita Walters  [id number]  1970    Chart reviewed, discharge follow-up plan discussed with patient's bedside RN, needs assessed. Post-delivery follow-up appointment planned in 2&6 weeks at \[facility]\"" OB clinic. Home care nurse visit not ordered by discharging provider.    Patient is reported to have support at home and is ready to discharge today with . RN Care Coordinator will continue to follow and assist with discharge planning as needed. Stefanie Jo RN on 2023 at 2:08 PM

## 2023-12-20 NOTE — PROGRESS NOTES
12/20/23   Chuyita Walters   1970    Postpartum Rounding Note    Subjective: Patient doing well. She is tolerating general diet, urinating without difficulty, and ambulating without lightheadedness. She is breastfeeding and pumping. Vaginal bleeding is within normal limits. Positive flatus and bowel movement yesterday. She feels ready to go home today.    Vital Signs:  Vitals:    12/19/23 1639 12/20/23 0030 12/20/23 0811 12/20/23 1028   BP: 127/72 107/63 113/66    BP Location: Left arm Left arm Right arm    Patient Position:       Cuff Size:       Pulse: 87 78 98    Resp: 16 16 16    Temp: 98.7  F (37.1  C) 98  F (36.7  C) 98.4  F (36.9  C)    TempSrc: Oral Oral Oral    SpO2: 99%  96%    Weight:    83.5 kg (184 lb)   Height:         Physical Exam:  General:   no distress  Abdomen: soft, non-tender, non-distended, uterine fundus firm below umbilicus, incision intact  Extremities: no calf pain, no edema in legs    Recent Labs   Lab Test 12/18/23  0632 12/17/23  1124   WBC  --  11.4*   HGB 12.4 13.5   HCT  --  40.9   PLT  --  280   NA  --  137   CR  --  0.54   AST  --  28   ALT  --  17       Assessment/Plan: 53 year old PPD#3 s/p RCS  1. Postpartum    - afebrile, VSS  - continue postpartum cares  2. GDMA2   - no further accuchecks  - pt prefers to continue wearing CGM and will remove in the next few days   - instead of 2h GTT at 6w PP, per patient, M offered the patient use her remaining CGM for 10 days at that time and she will plan for this  3. Varicose Veins   - continue compression socks    Discussed plan of care and reviewed discharge instructions with patient, and patient expressed understanding. Opportunity for questions given, and all questions were answered.    Disposition: plan for discharge home today.      Kristal Oliver MD

## 2023-12-20 NOTE — PLAN OF CARE
Problem: Adult Inpatient Plan of Care  Goal: Plan of Care Review  Description: The Plan of Care Review/Shift note should be completed every shift.  The Outcome Evaluation is a brief statement about your assessment that the patient is improving, declining, or no change.  This information will be displayed automatically on your shift  note.  Outcome: Progressing    Progressing well. Vital signs stable. Voiding frequently and passing flatus. Had first bowel movement this afternoon. Reports incisional pain. Pain managed with scheduled Ibuprofen and Tylenol (See MAR).   Bonding well with baby and attentive to  cues. Utilizing hospital grade breast pump. Encouraged to pump at least 8 times in 24 hours. Utilizing reverse pressure softening. Expressed 17mL maternal breast milk this afternoon.

## 2023-12-20 NOTE — PLAN OF CARE
Problem: Adult Inpatient Plan of Care  Goal: Readiness for Transition of Care  Outcome: Met     Patient assessments and vitals are WDL. Spouse is supportive of pt. Birth certificate and PPMA are done. Mother has been breastfeeding infant and supplementing with maternal expressed pumped milk via cup. Educated on paced bottle feeding with bottle - verbalized understanding. Weight obtained. Would like a hospital grade pump rental for discharge and is willing to pay the monthly rental. Educated patient on medications, worsening symptoms, and follow-up care. Pt verbalized understanding with no further questions.

## 2023-12-20 NOTE — DISCHARGE INSTRUCTIONS
Warning Signs after Having a Baby    Keep this paper on your fridge or somewhere else where you can see it.    Call your provider if you have any of these symptoms up to 12 weeks after having your baby.    Thoughts of hurting yourself or your baby  Pain in your chest or trouble breathing  Severe headache not helped by pain medicine  Eyesight concerns (blurry vision, seeing spots or flashes of light, other changes to eyesight)  Fainting, shaking or other signs of a seizure    Call 9-1-1 if you feel that it is an emergency.     The symptoms below can happen to anyone after giving birth. They can be very serious. Call your provider if you have any of these warning signs.    My provider s phone number: _______________________    Losing too much blood (hemorrhage)    Call your provider if you soak through a pad in less than an hour or pass blood clots bigger than a golf ball. These may be signs that you are bleeding too much.    Blood clots in the legs or lungs    After you give birth, your body naturally clots its blood to help prevent blood loss. Sometimes this increased clotting can happen in other areas of the body, like the legs or lungs. This can block your blood flow and be very dangerous.     Call your provider if you:  Have a red, swollen spot on the back of your leg that is warm or painful when you touch it.   Are coughing up blood.     Infection    Call your provider if you have any of these symptoms:  Fever of 100.4 F (38 C) or higher.  Pain or redness around your stitches if you had an incision.   Any yellow, white, or green fluid coming from places where you had stitches or surgery.    Mood Problems (postpartum depression)    Many people feel sad or have mood changes after having a baby. But for some people, these mood swings are worse.     Call your provider right away if you feel so anxious or nervous that you can't care for yourself or your baby.    Preeclampsia (high blood pressure)    Even if you  didn't have high blood pressure when you were pregnant, you are at risk for the high blood pressure disease called preeclampsia. This risk can last up to 12 weeks after giving birth.     Call your provider if you have:   Pain on your right side under your rib cage  Sudden swelling in the hands and face    Remember: You know your body. If something doesn't feel right, get medical help.     For informational purposes only. Not to replace the advice of your health care provider. Copyright 2020 [facility]. All rights reserved. Clinically reviewed by Kierra Villaseñor, RNC-OB, MSN. nprogress [id number] - Rev .    Postop  Birth Instructions    Activity     Do not lift more than 10 pounds for 6 weeks after surgery.  Ask family and friends for help when you need it.  No driving until you have stopped taking your pain medications (usually two weeks after surgery).  No heavy exercise or activity for 6 weeks.  Don't do anything that will put a strain on your surgery site.  Don't strain when using the toilet.  Your care team may prescribe a stool softener if you have problems with your bowel movements.     To care for your incision:     Keep the incision clean and dry.  Do not soak your incision in water. No swimming or hot tubs until it has fully healed. You may soak in the bathtub if the water level is below your incision.  Do not use peroxide, gel, cream, lotion, or ointment on your incision.  Adjust your clothes to avoid pressure on your surgery site (check the elastic in your underwear for example).     You may see a small amount of clear or pink drainage and this is normal.  Check with your health care provider:     If the drainage increases or has an odor.  If the incision reddens, you have swelling, or develop a rash.  If you have increased pain and the medicine we prescribed doesn't help.  If you have a fever above 100.4 F (38 C) with or without chills when placing thermometer under your tongue.    The area around your incision (surgery wound), will feel numb.  This is normal. The numbness should go away in less than a year.     Keep your hands clean:  Always wash your hands before touching your incision (surgery wound). This helps reduce your risk of infection. If your hands aren't dirty, you may use an alcohol hand-rub to clean your hands. Keep your nails clean and short.    Call your healthcare provider if you have any of these symptoms:     You soak a sanitary pad with blood within 1 hour, or you see blood clots larger than a golf ball.  Bleeding that lasts more than 6 weeks.  Vaginal discharge that smells bad.  Severe pain, cramping or tenderness in your lower belly area.  A need to urinate more frequently (use the toilet more often), more urgently (use the toilet very quickly), or it burns when you urinate.  Nausea and vomiting.  Redness, swelling or pain around a vein in your leg.  Problems breastfeeding or a red or painful area on your breast.  Chest pain and cough or are gasping for air.  Problems with coping with sadness, anxiety or depression. If you have concerns about hurting yourself or the baby, call your provider immediately.    You have questions or concerns after you return home.     [facility]  Discharge Instructions:    Congratulations on the birth of your baby boy!    Because you have a large abdominal wound, please do not lift heavier than fifteen pounds for six weeks. You should let someone else push the vacuum sweeper and lift laundry baskets for six weeks.  Do not drive while you have significant pain. Before driving, sit in the car with it turned off and press the brakes quickly; if you are able to do this without significant pain, it is ok to drive.   Do not drive if you are taking narcotic pain medications (oxycodone, hydrocodone); wait until 24 hours after the last dose.    Please call if your bleeding is bright red more than a pad an hour and doesn't seem to be  slowing down.   Please call if your temperature goes above 100 degrees.   Please call if your incision is red, hot, painful, swells, or has puss coming out of it.     You may use Tylenol 1000mg every 8 hours and/or Ibuprofen 800mg every 8 hours for pain. If needed, you may take oxycodone 5mg every 6 hours. Take a stool softener (such as docusate sodium 100mg 1-2 times daily) if you take oxycodone.     Recommend taking prenatal vitamin, vitamin D 2000 units/day, and fish oil with DHA while you are breast-feeding. Assure you are eating adequate amounts of protein (about 75g per day if breastfeeding without supplementation during the first 6 months of your baby's life) as well as healthy fats (avocado, olive oil, avocado oil, coconut oil) while breast-feeding.    Call [phone] for questions during the day and for emergencies on nights and weekends.    Please make an appointment in two weeks and six weeks.     Recommend taking iron daily. Se[employer] Optimal Iron is a good option that has cofactors to help the iron be absorbed and decrease the side effects of taking iron.      Kristal Oliver MD

## 2023-12-20 NOTE — DISCHARGE SUMMARY
Discharge summary    23   Chuyita Walters   1970    Admit date: 2023     Discharge date: 23       Admit diagnoses:  1. 53 year old  at 36w3d  2. GDMA2  3. Very advanced maternal age  4. H/o  x1  5. H/o gestational HTN  6. Varicose veins  7. Low pregnancy weight gain  8. PPROM    Discharge diagnoses:  1. Live male infant born at 36w3d  2. S/p repeat low transverse   3. Varicose veins    Hospital course:  The patient was admitted at 36w3d for PPROM. See H&P for details. The patient had a live male infant by repeat  section at 36w3d; see operative report for details. After delivery, she was tolerating a normal diet, voiding urine without difficulty, ambulating without lightheadedness, and her vaginal bleeding was within normal limits for postpartum.  She was discharged to home on postpartum day 3.    Rubella immune  Rh negative, baby Rh negative    Disposition: Discharged to home with follow-up in office in 2 and 6 weeks.     Kristal Oliver MD

## 2024-03-14 ENCOUNTER — HOSPITAL ENCOUNTER (EMERGENCY)
Facility: CLINIC | Age: 54
Discharge: HOME | End: 2024-03-14
Attending: PHYSICIAN ASSISTANT | Admitting: PHYSICIAN ASSISTANT

## 2024-03-14 ENCOUNTER — APPOINTMENT (OUTPATIENT)
Dept: GENERAL RADIOLOGY | Facility: CLINIC | Age: 54
End: 2024-03-14
Attending: PHYSICIAN ASSISTANT

## 2024-03-14 VITALS
SYSTOLIC BLOOD PRESSURE: 133 MMHG | TEMPERATURE: 99.4 F | DIASTOLIC BLOOD PRESSURE: 74 MMHG | OXYGEN SATURATION: 96 % | HEART RATE: 114 BPM | RESPIRATION RATE: 16 BRPM

## 2024-03-14 DIAGNOSIS — J18.9 COMMUNITY ACQUIRED PNEUMONIA OF RIGHT LUNG, UNSPECIFIED PART OF LUNG: ICD-10-CM

## 2024-03-14 PROCEDURE — 99203 OFFICE O/P NEW LOW 30 MIN: CPT | Performed by: PHYSICIAN ASSISTANT

## 2024-03-14 PROCEDURE — 71046 X-RAY EXAM CHEST 2 VIEWS: CPT

## 2024-03-14 PROCEDURE — G0463 HOSPITAL OUTPT CLINIC VISIT: HCPCS | Mod: 25 | Performed by: PHYSICIAN ASSISTANT

## 2024-03-14 RX ORDER — AZITHROMYCIN 250 MG/1
TABLET, FILM COATED ORAL
Qty: 6 TABLET | Refills: 0 | Status: SHIPPED | OUTPATIENT
Start: 2024-03-14 | End: 2024-03-19

## 2024-03-14 RX ORDER — AMOXICILLIN 500 MG/1
1000 CAPSULE ORAL 3 TIMES DAILY
Qty: 60 CAPSULE | Refills: 0 | Status: SHIPPED | OUTPATIENT
Start: 2024-03-14 | End: 2024-03-24

## 2024-03-14 ASSESSMENT — ACTIVITIES OF DAILY LIVING (ADL)
ADLS_ACUITY_SCORE: 35
ADLS_ACUITY_SCORE: 35

## 2024-03-14 ASSESSMENT — COLUMBIA-SUICIDE SEVERITY RATING SCALE - C-SSRS
6. HAVE YOU EVER DONE ANYTHING, STARTED TO DO ANYTHING, OR PREPARED TO DO ANYTHING TO END YOUR LIFE?: NO
1. IN THE PAST MONTH, HAVE YOU WISHED YOU WERE DEAD OR WISHED YOU COULD GO TO SLEEP AND NOT WAKE UP?: NO
2. HAVE YOU ACTUALLY HAD ANY THOUGHTS OF KILLING YOURSELF IN THE PAST MONTH?: NO

## 2024-03-15 NOTE — ED PROVIDER NOTES
History     Chief Complaint   Patient presents with    Fever     HPI  Chuyita Walters is a 53 year old female who presents to urgent care with concern over fever up to 1016 which is been present for up to 6 days.  Patient complains of chills, myalgias, fatigue, hoarse voice, nasal congestion, cough.  She states that fever seems to peak at night.  She denies any significant sore throat, nasal congestion, dyspnea,wheezing,vomiting, diarrhea, abdominal pain, dysuria,hematuria.  She has not taken any OTC treatments in the last eight hours.  She does have close contacts with URI symptoms. No known exposures to influenza, COVID-19    Allergies:  No Known Allergies    Problem List:    Patient Active Problem List    Diagnosis Date Noted    Encounter for triage in pregnant patient 2023     Priority: Medium    Leakage of amniotic fluid 2023     Priority: Medium        Past Medical History:    No past medical history on file.    Past Surgical History:    Past Surgical History:   Procedure Laterality Date     SECTION N/A 2023    Procedure:  SECTION;  Surgeon: Makeda Sarabia MD;  Location: [facility]       Family History:    No family history on file.    Social History:  Marital Status:   [2]  Social History     Tobacco Use    Smoking status: Never    Smokeless tobacco: Never        Medications:    docusate sodium (COLACE) 100 MG capsule  ibuprofen (ADVIL/MOTRIN) 800 MG tablet  Prenatal Vit-Fe Fumarate-FA (PNV PRENATAL PLUS MULTIVITAMIN) 27-1 MG TABS per tablet  Vitamin D3 (VITAMIN D, CHOLECALCIFEROL,) 25 mcg (1000 units) tablet      Review of Systems  CONSTITUTIONAL:POSITIVE  for fever, chills, myalgias, fatigue   INTEGUMENTARY/SKIN: NEGATIVE for worrisome rashes, moles or lesions  EYES: NEGATIVE for vision changes or irritation  ENT/MOUTH: POSITIVE for nasal congestion  NEGATIVE for sore throat, ear pain   RESP:POSITIVE for cough and NEGATIVE for OB/dyspnea and wheezing  GI:  NEGATIVE for vomiting, diarrhea, abdominal pian   : NEGATIVE or dysuria, hematuria   Physical Exam   BP: 133/74  Pulse: 114  Temp: 99.4  F (37.4  C)  Resp: 16  SpO2: 96 %  Physical Exam  GENERAL APPEARANCE: alert, cooperative, non-toxic, fatigued appearing   EYES: EOMI,  PERRL, conjunctiva clear  HENT: ear canals and TM's normal.  Nose and mouth without ulcers, erythema or lesions  NECK: supple, nontender, no lymphadenopathy  RESP: crackles right lower lung fields, no wheezing   CV: regular rates and rhythm, normal S1 S2, no murmur noted  ABDOMEN:  soft, nontender, no HSM or masses and bowel sounds normal, on CVA tenderness  SKIN: no suspicious lesions or rashes  ED Course        Procedures           Critical Care time:  none            Results for orders placed or performed during the hospital encounter of 03/14/24   Chest XR,  PA & LAT     Status: None    Narrative    EXAM: XR CHEST 2 VIEWS  LOCATION: [facility]  DATE: 3/14/2024    INDICATION: cough, fever.  COMPARISON: None.      Impression    IMPRESSION: Foci of consolidation and volume loss posterior and lateral basilar right lower lobe consistent with pneumonia in the proper clinical setting. Chest otherwise negative.     Medications - No data to display    Assessments & Plan (with Medical Decision Making)     I have reviewed the nursing notes.    I have reviewed the findings, diagnosis, plan and need for follow up with the patient.       Discharge Medication List as of 3/14/2024  8:43 PM        START taking these medications    Details   amoxicillin (AMOXIL) 500 MG capsule Take 2 capsules (1,000 mg) by mouth 3 times daily for 10 days, Disp-60 capsule, R-0, E-Prescribe      azithromycin (ZITHROMAX Z-PAGE) 250 MG tablet Two tablets on the first day, then one tablet daily for the next 4 days, Disp-6 tablet, R-0, E-Prescribe             Final diagnoses:   Community acquired pneumonia of right lung, unspecified part of lung      53-year-old female currently breast-feeding presents to urgent care with concern over fever measured up to 101.6 at peak which is been present for the last 5 days with associated cough, fatigue.  She was tachycardic upon arrival, remainder vital signs stable.  Physical exam findings significant for crackles in her right lower lung.  She had chest x-ray which was read as positive per my reprot, discharged home with antibiotics.  Radiology report did later confirm a focal consolidation and volume loss posterior lateral basilar right lower lobe consistent with pneumonia in appropriate clinical setting per radiology report.  She was instructed to initiate amoxicillin, Zithromax.  Follow-up if no resolution of fever within the next 48 to 72 hours.  Worrisome reasons to return to the ER/UC sooner discussed.    Disclaimer: This note consists of symbols derived from keyboarding, dictation, and/or voice recognition software. As a result, there may be errors in the script that have gone undetected.  Please consider this when interpreting information found in the chart.    3/14/2024   [facility] EMERGENCY DEPT       Maricarmen Burkett PA-C  03/17/24 0828

## 2024-07-20 ENCOUNTER — HEALTH MAINTENANCE LETTER (OUTPATIENT)
Age: 54
End: 2024-07-20

## 2025-06-07 ENCOUNTER — HEALTH MAINTENANCE LETTER (OUTPATIENT)
Age: 55
End: 2025-06-07

## 2025-08-09 ENCOUNTER — HEALTH MAINTENANCE LETTER (OUTPATIENT)
Age: 55
End: 2025-08-09

## (undated) DEVICE — ESU GROUND PAD ADULT REM W/15' CORD E7507DB

## (undated) DEVICE — SU DERMABOND ADVANCED .7ML DNX12

## (undated) DEVICE — SUCTION MANIFOLD NEPTUNE 2 SYS 1 PORT 702-025-000

## (undated) DEVICE — SUTURE PDS 0 60IN CTX+ LOOPED PDP990G

## (undated) DEVICE — SOL WATER IRRIG 1000ML BOTTLE 2F7114

## (undated) DEVICE — CUSTOM PACK C-SECTION LHE

## (undated) DEVICE — SU CHROMIC 0 CT 36" 914H

## (undated) DEVICE — PREP CHLORAPREP 26ML TINTED HI-LITE ORANGE 930815

## (undated) DEVICE — STPL SKIN SUBCUTICULAR INSORB  2030

## (undated) DEVICE — SUTURE VICRYL+ 3-0 27IN CT-1 UND VCP258H

## (undated) DEVICE — SOL NACL 0.9% IRRIG 1000ML BOTTLE 2F7124

## (undated) DEVICE — GLOVE BIOGEL PI ULTRATOUCH G SZ 7.5 42175

## (undated) DEVICE — GOWN IMPERVIOUS BREATHABLE SMART LG 89015

## (undated) DEVICE — PACK MINOR SINGLE BASIN SSK3001

## (undated) RX ORDER — CEFAZOLIN SODIUM 1 G/3ML
INJECTION, POWDER, FOR SOLUTION INTRAMUSCULAR; INTRAVENOUS
Status: DISPENSED
Start: 2023-12-17

## (undated) RX ORDER — FENTANYL CITRATE-0.9 % NACL/PF 10 MCG/ML
PLASTIC BAG, INJECTION (ML) INTRAVENOUS
Status: DISPENSED
Start: 2023-12-17

## (undated) RX ORDER — ONDANSETRON 2 MG/ML
INJECTION INTRAMUSCULAR; INTRAVENOUS
Status: DISPENSED
Start: 2023-12-17

## (undated) RX ORDER — MORPHINE SULFATE 1 MG/ML
INJECTION, SOLUTION EPIDURAL; INTRATHECAL; INTRAVENOUS
Status: DISPENSED
Start: 2023-12-17